# Patient Record
Sex: FEMALE | Race: BLACK OR AFRICAN AMERICAN | NOT HISPANIC OR LATINO | Employment: OTHER | URBAN - NONMETROPOLITAN AREA
[De-identification: names, ages, dates, MRNs, and addresses within clinical notes are randomized per-mention and may not be internally consistent; named-entity substitution may affect disease eponyms.]

---

## 2017-01-23 ENCOUNTER — TRANSCRIBE ORDERS (OUTPATIENT)
Dept: ADMINISTRATIVE | Facility: HOSPITAL | Age: 82
End: 2017-01-23

## 2017-01-23 ENCOUNTER — LAB (OUTPATIENT)
Dept: LAB | Facility: HOSPITAL | Age: 82
End: 2017-01-23
Attending: INTERNAL MEDICINE

## 2017-01-23 DIAGNOSIS — N18.30 CHRONIC KIDNEY DISEASE, STAGE III (MODERATE) (HCC): Primary | ICD-10-CM

## 2017-01-23 DIAGNOSIS — N18.9 CKD (CHRONIC KIDNEY DISEASE), UNSPECIFIED STAGE: ICD-10-CM

## 2017-01-23 LAB
ALBUMIN SERPL-MCNC: 4.2 G/DL (ref 3.5–5)
ANION GAP SERPL CALCULATED.3IONS-SCNC: 7 MMOL/L (ref 4–13)
BUN BLD-MCNC: 24 MG/DL (ref 5–21)
BUN/CREAT SERPL: 19.2 (ref 7–25)
CALCIUM SPEC-SCNC: 9.4 MG/DL (ref 8.4–10.4)
CHLORIDE SERPL-SCNC: 103 MMOL/L (ref 98–110)
CO2 SERPL-SCNC: 31 MMOL/L (ref 24–31)
CREAT BLD-MCNC: 1.25 MG/DL (ref 0.5–1.4)
CREAT UR-MCNC: 261.6 MG/DL
DEPRECATED RDW RBC AUTO: 56.1 FL (ref 40–54)
ERYTHROCYTE [DISTWIDTH] IN BLOOD BY AUTOMATED COUNT: 14.9 % (ref 12–15)
GFR SERPL CREATININE-BSD FRML MDRD: 50 ML/MIN/1.73
GLUCOSE BLD-MCNC: 106 MG/DL (ref 70–100)
HCT VFR BLD AUTO: 38 % (ref 37–47)
HGB BLD-MCNC: 11.9 G/DL (ref 12–16)
MCH RBC QN AUTO: 32.2 PG (ref 28–32)
MCHC RBC AUTO-ENTMCNC: 31.3 G/DL (ref 33–36)
MCV RBC AUTO: 102.7 FL (ref 82–98)
PHOSPHATE SERPL-MCNC: 3.9 MG/DL (ref 2.5–4.5)
PLATELET # BLD AUTO: 274 10*3/MM3 (ref 130–400)
PMV BLD AUTO: 11.1 FL (ref 6–12)
POTASSIUM BLD-SCNC: 4.4 MMOL/L (ref 3.5–5.3)
PROT UR-MCNC: NORMAL MG/DL (ref 0–13.5)
PTH-INTACT SERPL-MCNC: 32.2 PG/ML (ref 7.5–53.5)
RBC # BLD AUTO: 3.7 10*6/MM3 (ref 4.2–5.4)
SODIUM BLD-SCNC: 141 MMOL/L (ref 135–145)
URATE SERPL-MCNC: 5.4 MG/DL (ref 2.7–7.5)
WBC NRBC COR # BLD: 5.77 10*3/MM3 (ref 4.8–10.8)

## 2017-01-23 PROCEDURE — 36415 COLL VENOUS BLD VENIPUNCTURE: CPT | Performed by: INTERNAL MEDICINE

## 2017-01-23 PROCEDURE — 84156 ASSAY OF PROTEIN URINE: CPT | Performed by: INTERNAL MEDICINE

## 2017-01-23 PROCEDURE — 80069 RENAL FUNCTION PANEL: CPT | Performed by: INTERNAL MEDICINE

## 2017-01-23 PROCEDURE — 84550 ASSAY OF BLOOD/URIC ACID: CPT | Performed by: INTERNAL MEDICINE

## 2017-01-23 PROCEDURE — 85027 COMPLETE CBC AUTOMATED: CPT | Performed by: INTERNAL MEDICINE

## 2017-01-23 PROCEDURE — 83970 ASSAY OF PARATHORMONE: CPT | Performed by: INTERNAL MEDICINE

## 2017-01-23 PROCEDURE — 82570 ASSAY OF URINE CREATININE: CPT | Performed by: INTERNAL MEDICINE

## 2017-01-25 RX ORDER — FENOFIBRATE 145 MG/1
145 TABLET, COATED ORAL DAILY
COMMUNITY
End: 2017-01-31 | Stop reason: ALTCHOICE

## 2017-01-25 RX ORDER — HYDROCHLOROTHIAZIDE 12.5 MG/1
12.5 TABLET ORAL DAILY
COMMUNITY
End: 2017-01-31 | Stop reason: ALTCHOICE

## 2017-01-25 RX ORDER — GLIMEPIRIDE 2 MG/1
2 TABLET ORAL
COMMUNITY
End: 2017-01-31 | Stop reason: ALTCHOICE

## 2017-01-31 ENCOUNTER — OFFICE VISIT (OUTPATIENT)
Dept: INTERNAL MEDICINE | Facility: CLINIC | Age: 82
End: 2017-01-31

## 2017-01-31 VITALS
OXYGEN SATURATION: 97 % | WEIGHT: 90.8 LBS | DIASTOLIC BLOOD PRESSURE: 70 MMHG | SYSTOLIC BLOOD PRESSURE: 150 MMHG | HEART RATE: 84 BPM | BODY MASS INDEX: 16.09 KG/M2 | HEIGHT: 63 IN | RESPIRATION RATE: 16 BRPM

## 2017-01-31 DIAGNOSIS — D50.8 OTHER IRON DEFICIENCY ANEMIA: ICD-10-CM

## 2017-01-31 DIAGNOSIS — N18.30 CHRONIC KIDNEY DISEASE, STAGE III (MODERATE) (HCC): ICD-10-CM

## 2017-01-31 DIAGNOSIS — E03.9 ACQUIRED HYPOTHYROIDISM: ICD-10-CM

## 2017-01-31 DIAGNOSIS — I10 ESSENTIAL HYPERTENSION: Primary | ICD-10-CM

## 2017-01-31 PROCEDURE — 99214 OFFICE O/P EST MOD 30 MIN: CPT | Performed by: INTERNAL MEDICINE

## 2017-01-31 RX ORDER — FERROUS SULFATE 325(65) MG
325 TABLET ORAL
Qty: 270 TABLET | Refills: 1 | Status: SHIPPED | OUTPATIENT
Start: 2017-01-31 | End: 2017-07-27 | Stop reason: SDUPTHER

## 2017-01-31 RX ORDER — LEVOTHYROXINE SODIUM 0.1 MG/1
100 TABLET ORAL DAILY
Qty: 90 TABLET | Refills: 1 | Status: SHIPPED | OUTPATIENT
Start: 2017-01-31 | End: 2017-07-27

## 2017-01-31 RX ORDER — LISINOPRIL 10 MG/1
10 TABLET ORAL DAILY
Qty: 90 TABLET | Refills: 1 | Status: SHIPPED | OUTPATIENT
Start: 2017-01-31 | End: 2017-07-27

## 2017-01-31 NOTE — MR AVS SNAPSHOT
Saúl Santiago   1/31/2017 2:30 PM   Office Visit    Dept Phone:  [phone]   Encounter #:  [id number]    Provider:  Jorgito Muñoz DO   Department:  [facility]                Your Full Care Plan              Today's Medication Changes          These changes are accurate as of: 1/31/17  3:04 PM.  If you have any questions, ask your nurse or doctor.               Medication(s)that have changed:     ferrous sulfate 325 (65 FE) MG tablet   Take 1 tablet by mouth 3 (Three) Times a Day With Meals.   What changed:  See the new instructions.   Changed by:  Jorgito Muñoz DO         Stop taking medication(s)listed here:     fenofibrate 145 MG tablet   Commonly known as:  TRICOR   Stopped by:  Jorgito Muñoz DO           glimepiride 2 MG tablet   Commonly known as:  AMARYL   Stopped by:  Jorgito Muñoz DO           hydrochlorothiazide 12.5 MG tablet   Commonly known as:  HYDRODIURIL   Stopped by:  Jorgito Muñoz DO           metFORMIN 500 MG tablet   Commonly known as:  GLUCOPHAGE   Stopped by:  Jorgito Muñoz DO                Where to Get Your Medications      These medications were sent to [facility] [address] [place] [address] - [address] AT [facility] [address] - [phone] [facility] [phone]   [address], [facility] [address]    Hours:  24-hours Phone:  [phone]     ferrous sulfate 325 (65 FE) MG tablet    levothyroxine 100 MCG tablet    lisinopril 10 MG tablet                  Your Updated Medication List          This list is accurate as of: 1/31/17  3:04 PM.  Always use your most recent med list.                ferrous sulfate 325 (65 FE) MG tablet   Take 1 tablet by mouth 3 (Three) Times a Day With Meals.       levothyroxine 100 MCG tablet   Commonly known as:  SYNTHROID   Take 1 tablet by mouth Daily.       lisinopril 10 MG tablet   Commonly known as:  PRINIVIL,ZESTRIL   Take 1  "tablet by mouth Daily.               You Were Diagnosed With        Codes Comments    Essential hypertension    -  Primary ICD-10-CM: I10  ICD-9-CM: 401.9     Acquired hypothyroidism     ICD-10-CM: E03.9  ICD-9-CM: 244.9     Other iron deficiency anemia     ICD-10-CM: D50.8     Chronic kidney disease, stage III (moderate)     ICD-10-CM: N18.3  ICD-9-CM: 585.3       Instructions     None    Patient Instructions History      Upcoming Appointments     Visit Type Date Time Department    FOLLOW UP 2017  2:30 PM [facility]    FOLLOW UP 2017 10:00 AM [facility]      [employer] Signup     [facility] [employer] allows you to send messages to your doctor, view your test results, renew your prescriptions, schedule appointments, and more. To sign up, go to [employer] and click on the Sign Up Now link in the New User? box. Enter your [employer] Activation Code exactly as it appears below along with the last four digits of your Social Security Number and your Date of Birth () to complete the sign-up process. If you do not sign up before the expiration date, you must request a new code.    [employer] Activation Code: [email or web address]  Expires: 2017  3:04 PM    If you have questions, you can email [email or web address] or call [phone] to talk to our [employer] staff. Remember, [employer] is NOT to be used for urgent needs. For medical emergencies, dial 911.               Other Info from Your Visit           Your Appointments     2017 10:00 AM CDT   Follow Up with Jorgito Muñoz DO   [facility] MEDICAL [facility] (--)    [address] [place] [address] [address] [place] [id number] [phone]           Arrive 15 minutes prior to appointment.              Allergies     No Known Allergies      Vital Signs     Blood Pressure Pulse Respirations Height Weight Oxygen Saturation    150/70 (BP Location: Left arm, Patient Position: Sitting, Cuff Size: Adult) 84 16 63\" (160 cm) 90 lb " 12.8 oz (41.2 kg) 97%    Body Mass Index Smoking Status                16.08 kg/m2 Current Every Day Smoker          Problems and Diagnoses Noted     Acquired underactive thyroid    Chronic kidney disease, stage III (moderate)    High blood pressure    Mixed hyperlipidemia    Iron deficiency anemia

## 2017-01-31 NOTE — PROGRESS NOTES
"CC: Follow-up hypertension and CK D    History:  Saúl Santiago is a 83 y.o. female who presents today for follow-up for evaluation of the above:  She reports she has been doing reasonably well.  Her son presents with her.  She states she has not had any falls and has been ambulate and without difficulty.  She continues to take lisinopril for her blood pressure and CK D and is without symptoms or side effects.  She also continues on Synthroid and reports no issues with this.  She reports she does not eat much, though her weight has seemed reasonably stable recently.    ROS:  Review of Systems   Constitutional: Negative for chills and fever.   Respiratory: Negative for cough and shortness of breath.    Cardiovascular: Negative for chest pain and palpitations.       Ms. Santiago  reports that she has been smoking Cigarettes.  She has been smoking about 1.00 pack per day. She does not have any smokeless tobacco history on file. She reports that she does not drink alcohol or use illicit drugs.      Current Outpatient Prescriptions:   •  ferrous sulfate 325 (65 FE) MG tablet, Take 1 tablet by mouth 3 (Three) Times a Day With Meals., Disp: 270 tablet, Rfl: 1  •  levothyroxine (SYNTHROID) 100 MCG tablet, Take 1 tablet by mouth Daily., Disp: 90 tablet, Rfl: 1  •  lisinopril (PRINIVIL,ZESTRIL) 10 MG tablet, Take 1 tablet by mouth Daily., Disp: 90 tablet, Rfl: 1      OBJECTIVE:  Visit Vitals   • /70 (BP Location: Left arm, Patient Position: Sitting, Cuff Size: Adult)   • Pulse 84   • Resp 16   • Ht 63\" (160 cm)   • Wt 90 lb 12.8 oz (41.2 kg)   • SpO2 97%   • BMI 16.08 kg/m2      Physical Exam   Constitutional: She appears well-developed and well-nourished. No distress.   Cardiovascular: Normal rate, regular rhythm and normal heart sounds.    No murmur heard.  Pulmonary/Chest: Effort normal and breath sounds normal. No respiratory distress. She has no wheezes.       Assessment/Plan    Diagnoses and all orders for this " visit:    Essential hypertension   -     Lipid Panel; Future  -     lisinopril (PRINIVIL,ZESTRIL) 10 MG tablet; Take 1 tablet by mouth Daily.  -     Comprehensive Metabolic Panel; Future  Fair control, BP goal for age is <140/90 per JNC 8 guidelines and continue current medications. Given age and cognitive deficit, I am trying to limit the number of pharmacologic agents to limit drug interactions and risk of misuse.  We will continue to monitor clinically.    Acquired hypothyroidism  -     TSH; Future  -     levothyroxine (SYNTHROID) 100 MCG tablet; Take 1 tablet by mouth Daily.  We will continue Synthroid and plan to recheck TSH prior to next visit.    Other iron deficiency anemia  -     ferrous sulfate 325 (65 FE) MG tablet; Take 1 tablet by mouth 3 (Three) Times a Day With Meals.  Iron supplement was refilled.    Chronic kidney disease, stage III (moderate)  -     lisinopril (PRINIVIL,ZESTRIL) 10 MG tablet; Take 1 tablet by mouth Daily.  -     Comprehensive Metabolic Panel; Future  Follows with Dr. Koenig.  Creatinine on most recent renal function panel was 1.25 in January 2017.  This represents a better value then any in the past several years.  Continue ACE inhibitor at this time.      An After Visit Summary was printed and given to the patient at discharge.  Return in about 6 months (around 7/31/2017) for Recheck. Sooner if problems arise.         Jorgito Muñoz D.O. 1/31/2017

## 2017-05-09 DIAGNOSIS — I10 ESSENTIAL HYPERTENSION: ICD-10-CM

## 2017-05-09 DIAGNOSIS — E03.9 ACQUIRED HYPOTHYROIDISM: ICD-10-CM

## 2017-05-09 RX ORDER — LEVOTHYROXINE SODIUM 0.1 MG/1
100 TABLET ORAL DAILY
Qty: 90 TABLET | Refills: 0 | Status: SHIPPED | OUTPATIENT
Start: 2017-05-09 | End: 2017-07-27 | Stop reason: SDUPTHER

## 2017-05-09 RX ORDER — LISINOPRIL 10 MG/1
10 TABLET ORAL DAILY
Qty: 90 TABLET | Refills: 0 | Status: SHIPPED | OUTPATIENT
Start: 2017-05-09 | End: 2017-07-27 | Stop reason: SDUPTHER

## 2017-07-12 ENCOUNTER — TRANSCRIBE ORDERS (OUTPATIENT)
Dept: ADMINISTRATIVE | Facility: HOSPITAL | Age: 82
End: 2017-07-12

## 2017-07-12 ENCOUNTER — LAB (OUTPATIENT)
Dept: LAB | Facility: HOSPITAL | Age: 82
End: 2017-07-12
Attending: INTERNAL MEDICINE

## 2017-07-12 DIAGNOSIS — N18.30 CHRONIC KIDNEY DISEASE, STAGE III (MODERATE) (HCC): ICD-10-CM

## 2017-07-12 DIAGNOSIS — E03.9 ACQUIRED HYPOTHYROIDISM: ICD-10-CM

## 2017-07-12 DIAGNOSIS — I10 ESSENTIAL HYPERTENSION: ICD-10-CM

## 2017-07-12 DIAGNOSIS — N18.30 CHRONIC KIDNEY DISEASE, STAGE III (MODERATE) (HCC): Primary | ICD-10-CM

## 2017-07-12 LAB
ALBUMIN SERPL-MCNC: 4.1 G/DL (ref 3.5–5)
ALBUMIN/GLOB SERPL: 0.8 G/DL (ref 1.1–2.5)
ALP SERPL-CCNC: 49 U/L (ref 24–120)
ALT SERPL W P-5'-P-CCNC: 23 U/L (ref 0–54)
ANION GAP SERPL CALCULATED.3IONS-SCNC: 7 MMOL/L (ref 4–13)
ARTICHOKE IGE QN: 88 MG/DL (ref 0–99)
AST SERPL-CCNC: 22 U/L (ref 7–45)
BACTERIA UR QL AUTO: ABNORMAL /HPF
BILIRUB SERPL-MCNC: 0.4 MG/DL (ref 0.1–1)
BILIRUB UR QL STRIP: NEGATIVE
BUN BLD-MCNC: 14 MG/DL (ref 5–21)
BUN/CREAT SERPL: 12.2 (ref 7–25)
CALCIUM SPEC-SCNC: 9.5 MG/DL (ref 8.4–10.4)
CHLORIDE SERPL-SCNC: 106 MMOL/L (ref 98–110)
CHOLEST SERPL-MCNC: 223 MG/DL (ref 130–200)
CLARITY UR: ABNORMAL
CO2 SERPL-SCNC: 28 MMOL/L (ref 24–31)
COLOR UR: YELLOW
CREAT BLD-MCNC: 1.15 MG/DL (ref 0.5–1.4)
DEPRECATED RDW RBC AUTO: 53.4 FL (ref 40–54)
ERYTHROCYTE [DISTWIDTH] IN BLOOD BY AUTOMATED COUNT: 14.4 % (ref 12–15)
GFR SERPL CREATININE-BSD FRML MDRD: 55 ML/MIN/1.73
GLOBULIN UR ELPH-MCNC: 4.9 GM/DL
GLUCOSE BLD-MCNC: 93 MG/DL (ref 70–100)
GLUCOSE UR STRIP-MCNC: NEGATIVE MG/DL
HCT VFR BLD AUTO: 37.7 % (ref 37–47)
HDLC SERPL-MCNC: 75 MG/DL
HGB BLD-MCNC: 11.9 G/DL (ref 12–16)
HGB UR QL STRIP.AUTO: ABNORMAL
HYALINE CASTS UR QL AUTO: ABNORMAL /LPF
KETONES UR QL STRIP: NEGATIVE
LDLC/HDLC SERPL: 1.68 {RATIO}
LEUKOCYTE ESTERASE UR QL STRIP.AUTO: NEGATIVE
MCH RBC QN AUTO: 32.1 PG (ref 28–32)
MCHC RBC AUTO-ENTMCNC: 31.6 G/DL (ref 33–36)
MCV RBC AUTO: 101.6 FL (ref 82–98)
NITRITE UR QL STRIP: NEGATIVE
PH UR STRIP.AUTO: <=5 [PH] (ref 5–8)
PHOSPHATE SERPL-MCNC: 4.2 MG/DL (ref 2.5–4.5)
PLATELET # BLD AUTO: 218 10*3/MM3 (ref 130–400)
PMV BLD AUTO: 10.9 FL (ref 6–12)
POTASSIUM BLD-SCNC: 4.5 MMOL/L (ref 3.5–5.3)
PROT SERPL-MCNC: 9 G/DL (ref 6.3–8.7)
PROT UR QL STRIP: ABNORMAL
PTH-INTACT SERPL-MCNC: 27.6 PG/ML (ref 7.5–53.5)
RBC # BLD AUTO: 3.71 10*6/MM3 (ref 4.2–5.4)
RBC # UR: ABNORMAL /HPF
REF LAB TEST METHOD: ABNORMAL
SODIUM BLD-SCNC: 141 MMOL/L (ref 135–145)
SP GR UR STRIP: 1.02 (ref 1–1.03)
SQUAMOUS #/AREA URNS HPF: ABNORMAL /HPF
T4 FREE SERPL-MCNC: 0.6 NG/DL (ref 0.78–2.19)
TRIGL SERPL-MCNC: 109 MG/DL (ref 0–149)
TSH SERPL DL<=0.05 MIU/L-ACNC: 46.1 MIU/ML (ref 0.47–4.68)
URATE SERPL-MCNC: 5.1 MG/DL (ref 2.7–7.5)
UROBILINOGEN UR QL STRIP: ABNORMAL
WBC NRBC COR # BLD: 5.07 10*3/MM3 (ref 4.8–10.8)
WBC UR QL AUTO: ABNORMAL /HPF

## 2017-07-12 PROCEDURE — 36415 COLL VENOUS BLD VENIPUNCTURE: CPT | Performed by: INTERNAL MEDICINE

## 2017-07-12 PROCEDURE — 85027 COMPLETE CBC AUTOMATED: CPT | Performed by: INTERNAL MEDICINE

## 2017-07-12 PROCEDURE — 84100 ASSAY OF PHOSPHORUS: CPT | Performed by: INTERNAL MEDICINE

## 2017-07-12 PROCEDURE — 84550 ASSAY OF BLOOD/URIC ACID: CPT | Performed by: INTERNAL MEDICINE

## 2017-07-12 PROCEDURE — 80061 LIPID PANEL: CPT | Performed by: INTERNAL MEDICINE

## 2017-07-12 PROCEDURE — 80053 COMPREHEN METABOLIC PANEL: CPT | Performed by: INTERNAL MEDICINE

## 2017-07-12 PROCEDURE — 84439 ASSAY OF FREE THYROXINE: CPT | Performed by: INTERNAL MEDICINE

## 2017-07-12 PROCEDURE — 84443 ASSAY THYROID STIM HORMONE: CPT | Performed by: INTERNAL MEDICINE

## 2017-07-12 PROCEDURE — 81001 URINALYSIS AUTO W/SCOPE: CPT | Performed by: INTERNAL MEDICINE

## 2017-07-12 PROCEDURE — 83970 ASSAY OF PARATHORMONE: CPT | Performed by: INTERNAL MEDICINE

## 2017-07-27 ENCOUNTER — OFFICE VISIT (OUTPATIENT)
Dept: INTERNAL MEDICINE | Facility: CLINIC | Age: 82
End: 2017-07-27

## 2017-07-27 VITALS
RESPIRATION RATE: 16 BRPM | HEART RATE: 78 BPM | WEIGHT: 86.2 LBS | SYSTOLIC BLOOD PRESSURE: 146 MMHG | HEIGHT: 63 IN | BODY MASS INDEX: 15.27 KG/M2 | OXYGEN SATURATION: 98 % | DIASTOLIC BLOOD PRESSURE: 88 MMHG

## 2017-07-27 DIAGNOSIS — D50.8 OTHER IRON DEFICIENCY ANEMIA: ICD-10-CM

## 2017-07-27 DIAGNOSIS — E03.9 ACQUIRED HYPOTHYROIDISM: ICD-10-CM

## 2017-07-27 DIAGNOSIS — N18.30 CHRONIC KIDNEY DISEASE, STAGE III (MODERATE) (HCC): ICD-10-CM

## 2017-07-27 DIAGNOSIS — I10 ESSENTIAL HYPERTENSION: Primary | ICD-10-CM

## 2017-07-27 DIAGNOSIS — R63.4 LOSING WEIGHT: ICD-10-CM

## 2017-07-27 DIAGNOSIS — Z72.0 TOBACCO ABUSE: ICD-10-CM

## 2017-07-27 DIAGNOSIS — F01.50 VASCULAR DEMENTIA WITHOUT BEHAVIORAL DISTURBANCE (HCC): ICD-10-CM

## 2017-07-27 PROBLEM — F03.90 DEMENTIA WITHOUT BEHAVIORAL DISTURBANCE (HCC): Status: ACTIVE | Noted: 2017-07-27

## 2017-07-27 PROCEDURE — 99214 OFFICE O/P EST MOD 30 MIN: CPT | Performed by: INTERNAL MEDICINE

## 2017-07-27 RX ORDER — FERROUS SULFATE 325(65) MG
325 TABLET ORAL
Qty: 90 TABLET | Refills: 1 | Status: SHIPPED | OUTPATIENT
Start: 2017-07-27 | End: 2018-03-06 | Stop reason: SDUPTHER

## 2017-07-27 RX ORDER — LISINOPRIL 10 MG/1
10 TABLET ORAL DAILY
Qty: 90 TABLET | Refills: 1 | Status: SHIPPED | OUTPATIENT
Start: 2017-07-27 | End: 2018-03-15

## 2017-07-27 RX ORDER — LEVOTHYROXINE SODIUM 0.1 MG/1
100 TABLET ORAL DAILY
Qty: 90 TABLET | Refills: 1 | Status: ON HOLD | OUTPATIENT
Start: 2017-07-27 | End: 2018-03-20

## 2017-07-27 NOTE — PROGRESS NOTES
CC: Follow-up hypothyroidism    History:  Saúl Santiago is a 84 y.o. female who presents today for follow-up for evaluation of the above:  She reports she has been doing well.  Her son accompanies her, though she continues to live at home alone.  She reports she is able to ambulate around the house and has not had any falls.  She notes she has been taking her medications as directed, though her son, who helps her in a limited fashion admits that she has been missing doses.  He estimates she misses 2-3 doses on average per week.  She does continue to lose weight and she admits she does not eat very much because she is not hungry.  She states she also does not like preparing food for just herself.  There is no one else that per [employer] food for her, though her son does occasionally bring in take out.  She notes she has no headache or vision disturbance, though her son admits she has likely missed her what pressure medicine as well.  She does continue to smoke and has no plans to quit.    ROS:  Review of Systems   Constitutional: Negative for chills and fever.   HENT: Negative for congestion and sore throat.    Eyes: Negative for visual disturbance.   Respiratory: Negative for cough and shortness of breath.    Cardiovascular: Negative for chest pain and palpitations.   Neurological: Negative for headaches.       Ms. Santiago  reports that she has been smoking Cigarettes.  She has been smoking about 1.00 pack per day. She has never used smokeless tobacco. She reports that she does not drink alcohol or use illicit drugs.      Current Outpatient Prescriptions:   •  ferrous sulfate 325 (65 FE) MG tablet, Take 1 tablet by mouth 3 (Three) Times a Day With Meals., Disp: 270 tablet, Rfl: 1  •  lisinopril (PRINIVIL,ZESTRIL) 10 MG tablet, Take 1 tablet by mouth Daily., Disp: 90 tablet, Rfl: 0  •  levothyroxine (SYNTHROID, LEVOTHROID) 100 MCG tablet, Take 1 tablet by mouth Daily., Disp: 90 tablet, Rfl: 0      OBJECTIVE:  BP  "146/88 (BP Location: Left arm, Patient Position: Sitting, Cuff Size: Adult)  Pulse 78  Resp 16  Ht 63\" (160 cm)  Wt 86 lb 3.2 oz (39.1 kg)  SpO2 98%  BMI 15.27 kg/m2   Physical ExamNone    Assessment/Plan    Diagnoses and all orders for this visit:    Essential hypertension  -     lisinopril (PRINIVIL,ZESTRIL) 10 MG tablet; Take 1 tablet by mouth Daily.  Well controlled, BP goal for age is <140/90 per JNC 8 guidelines and continue current medications    Tobacco abuse  Patient was counseled on and understood the many dangers of continuing to use tobacco. Despite this, Ms. Santiago states quitting is not an immediate priority at this time. I reminded the patient that if quitting becomes an increased priority to contact us for help with quitting including pharmacologic & nonpharmacologic options or any additional resources.     Chronic kidney disease, stage III (moderate)  Continue lisinopril.  She does follow with Dr. Koenig.  Creatinine stable on recent labs.    Acquired hypothyroidism  -     levothyroxine (SYNTHROID, LEVOTHROID) 100 MCG tablet; Take 1 tablet by mouth Daily.  TSH elevated and T4 low consistent with missed doses.  She is encouraged to make sure she takes all scheduled doses.  I spoke with her son regarding her dementia and his need to help her with medications.  He expressed understanding.    Other iron deficiency anemia  -     ferrous sulfate 325 (65 FE) MG tablet; Take 1 tablet by mouth 3 (Three) Times a Day With Meals.  She may continue ferrous sulfate.  Hemoglobin 11.9 on recent check.    Vascular dementia without behavioral disturbance  She seems to be independent with all activities of daily living, though I have concern with her worsening cognitive function that she may not be safe to live at home indefinitely.  I again spoke with her son regarding this condition.  I recommended he assist further with medications and with helping to provide meals.  We also spoke about alternative living " situation such as nursing home.  He states his sister-in-law helps manage a nursing home and he will talk with her further about this.  I feel her weight loss is likely a function of her vascular dementia.    Losing weight  We discussed the possibility of providing meals for providing low sodium frozen meals that would make preparation less difficult for her.  He expressed understanding, but intimated he feels she gets food when she is hungry.      An After Visit Summary was printed and given to the patient at discharge.  Return in about 6 months (around 1/27/2018). Sooner if problems arise.         Jorgito Muñoz D.O. 7/27/2017

## 2018-01-26 ENCOUNTER — TRANSCRIBE ORDERS (OUTPATIENT)
Dept: ADMINISTRATIVE | Facility: HOSPITAL | Age: 83
End: 2018-01-26

## 2018-01-26 ENCOUNTER — APPOINTMENT (OUTPATIENT)
Dept: LAB | Facility: HOSPITAL | Age: 83
End: 2018-01-26
Attending: INTERNAL MEDICINE

## 2018-01-26 DIAGNOSIS — N18.30 CHRONIC KIDNEY DISEASE, STAGE III (MODERATE) (HCC): Primary | ICD-10-CM

## 2018-01-26 LAB
ALBUMIN SERPL-MCNC: 4.5 G/DL (ref 3.5–5)
ANION GAP SERPL CALCULATED.3IONS-SCNC: 9 MMOL/L (ref 4–13)
BACTERIA UR QL AUTO: ABNORMAL /HPF
BILIRUB UR QL STRIP: ABNORMAL
BUN BLD-MCNC: 19 MG/DL (ref 5–21)
BUN/CREAT SERPL: 11.4 (ref 7–25)
CALCIUM SPEC-SCNC: 9.4 MG/DL (ref 8.4–10.4)
CHLORIDE SERPL-SCNC: 104 MMOL/L (ref 98–110)
CLARITY UR: CLEAR
CO2 SERPL-SCNC: 28 MMOL/L (ref 24–31)
COLOR UR: YELLOW
CREAT BLD-MCNC: 1.67 MG/DL (ref 0.5–1.4)
DEPRECATED RDW RBC AUTO: 57.3 FL (ref 40–54)
ERYTHROCYTE [DISTWIDTH] IN BLOOD BY AUTOMATED COUNT: 14.8 % (ref 12–15)
GFR SERPL CREATININE-BSD FRML MDRD: 35 ML/MIN/1.73
GLUCOSE BLD-MCNC: 94 MG/DL (ref 70–100)
GLUCOSE UR STRIP-MCNC: NEGATIVE MG/DL
HCT VFR BLD AUTO: 33.5 % (ref 37–47)
HGB BLD-MCNC: 10.8 G/DL (ref 12–16)
HGB UR QL STRIP.AUTO: ABNORMAL
HYALINE CASTS UR QL AUTO: ABNORMAL /LPF
KETONES UR QL STRIP: ABNORMAL
LEUKOCYTE ESTERASE UR QL STRIP.AUTO: ABNORMAL
MCH RBC QN AUTO: 33.5 PG (ref 28–32)
MCHC RBC AUTO-ENTMCNC: 32.2 G/DL (ref 33–36)
MCV RBC AUTO: 104 FL (ref 82–98)
NITRITE UR QL STRIP: NEGATIVE
PH UR STRIP.AUTO: 5.5 [PH] (ref 5–8)
PHOSPHATE SERPL-MCNC: 3.8 MG/DL (ref 2.5–4.5)
PLATELET # BLD AUTO: 232 10*3/MM3 (ref 130–400)
PMV BLD AUTO: 10.2 FL (ref 6–12)
POTASSIUM BLD-SCNC: 4.5 MMOL/L (ref 3.5–5.3)
PROT UR QL STRIP: ABNORMAL
PTH-INTACT SERPL-MCNC: 72.9 PG/ML (ref 7.5–53.5)
RBC # BLD AUTO: 3.22 10*6/MM3 (ref 4.2–5.4)
RBC # UR: ABNORMAL /HPF
REF LAB TEST METHOD: ABNORMAL
SODIUM BLD-SCNC: 141 MMOL/L (ref 135–145)
SP GR UR STRIP: >=1.03 (ref 1–1.03)
SQUAMOUS #/AREA URNS HPF: ABNORMAL /HPF
URATE SERPL-MCNC: 6.2 MG/DL (ref 2.7–7.5)
UROBILINOGEN UR QL STRIP: ABNORMAL
WBC NRBC COR # BLD: 7.66 10*3/MM3 (ref 4.8–10.8)
WBC UR QL AUTO: ABNORMAL /HPF

## 2018-01-26 PROCEDURE — 36415 COLL VENOUS BLD VENIPUNCTURE: CPT

## 2018-01-26 PROCEDURE — 85027 COMPLETE CBC AUTOMATED: CPT | Performed by: INTERNAL MEDICINE

## 2018-01-26 PROCEDURE — 84550 ASSAY OF BLOOD/URIC ACID: CPT | Performed by: INTERNAL MEDICINE

## 2018-01-26 PROCEDURE — 83970 ASSAY OF PARATHORMONE: CPT | Performed by: INTERNAL MEDICINE

## 2018-01-26 PROCEDURE — 80069 RENAL FUNCTION PANEL: CPT | Performed by: INTERNAL MEDICINE

## 2018-01-26 PROCEDURE — 81001 URINALYSIS AUTO W/SCOPE: CPT | Performed by: INTERNAL MEDICINE

## 2018-03-06 DIAGNOSIS — D50.8 OTHER IRON DEFICIENCY ANEMIA: ICD-10-CM

## 2018-03-06 RX ORDER — FERROUS SULFATE 325(65) MG
325 TABLET ORAL
Qty: 270 TABLET | Refills: 0 | Status: SHIPPED | OUTPATIENT
Start: 2018-03-06

## 2018-03-15 ENCOUNTER — APPOINTMENT (OUTPATIENT)
Dept: CT IMAGING | Facility: HOSPITAL | Age: 83
End: 2018-03-15

## 2018-03-15 ENCOUNTER — HOSPITAL ENCOUNTER (INPATIENT)
Facility: HOSPITAL | Age: 83
LOS: 5 days | Discharge: NURSING, REHAB OR LONG-TERM CARE | End: 2018-03-20
Attending: EMERGENCY MEDICINE | Admitting: INTERNAL MEDICINE

## 2018-03-15 ENCOUNTER — APPOINTMENT (OUTPATIENT)
Dept: GENERAL RADIOLOGY | Facility: HOSPITAL | Age: 83
End: 2018-03-15

## 2018-03-15 DIAGNOSIS — R13.12 OROPHARYNGEAL DYSPHAGIA: ICD-10-CM

## 2018-03-15 DIAGNOSIS — R79.89 ELEVATED TSH: Primary | ICD-10-CM

## 2018-03-15 DIAGNOSIS — T68.XXXA HYPOTHERMIA, INITIAL ENCOUNTER: ICD-10-CM

## 2018-03-15 DIAGNOSIS — M62.82 NON-TRAUMATIC RHABDOMYOLYSIS: ICD-10-CM

## 2018-03-15 DIAGNOSIS — S06.5XAA BILATERAL SUBDURAL HEMATOMAS (HCC): ICD-10-CM

## 2018-03-15 DIAGNOSIS — E03.9 ACQUIRED HYPOTHYROIDISM: ICD-10-CM

## 2018-03-15 DIAGNOSIS — E87.5 HYPERKALEMIA: ICD-10-CM

## 2018-03-15 DIAGNOSIS — R91.8 LUNG MASS: ICD-10-CM

## 2018-03-15 DIAGNOSIS — N17.9 AKI (ACUTE KIDNEY INJURY) (HCC): ICD-10-CM

## 2018-03-15 DIAGNOSIS — R41.82 ALTERED MENTAL STATUS, UNSPECIFIED ALTERED MENTAL STATUS TYPE: ICD-10-CM

## 2018-03-15 LAB
ALBUMIN SERPL-MCNC: 4.2 G/DL (ref 3.5–5)
ALBUMIN/GLOB SERPL: 0.7 G/DL (ref 1.1–2.5)
ALP SERPL-CCNC: 96 U/L (ref 24–120)
ALT SERPL W P-5'-P-CCNC: 34 U/L (ref 0–54)
ANION GAP SERPL CALCULATED.3IONS-SCNC: 18 MMOL/L (ref 4–13)
ANION GAP SERPL CALCULATED.3IONS-SCNC: 9 MMOL/L (ref 4–13)
ARTERIAL PATENCY WRIST A: POSITIVE
AST SERPL-CCNC: 74 U/L (ref 7–45)
ATMOSPHERIC PRESS: 750 MMHG
BACTERIA UR QL AUTO: ABNORMAL /HPF
BASE EXCESS BLDA CALC-SCNC: -3.5 MMOL/L (ref 0–2)
BASOPHILS # BLD AUTO: 0.02 10*3/MM3 (ref 0–0.2)
BASOPHILS NFR BLD AUTO: 0.3 % (ref 0–2)
BDY SITE: ABNORMAL
BILIRUB SERPL-MCNC: 1 MG/DL (ref 0.1–1)
BILIRUB UR QL STRIP: NEGATIVE
BODY TEMPERATURE: 37 C
BUN BLD-MCNC: 36 MG/DL (ref 5–21)
BUN BLD-MCNC: 36 MG/DL (ref 5–21)
BUN/CREAT SERPL: 17.4 (ref 7–25)
BUN/CREAT SERPL: 18.7 (ref 7–25)
CALCIUM SPEC-SCNC: 8.7 MG/DL (ref 8.4–10.4)
CALCIUM SPEC-SCNC: 9.7 MG/DL (ref 8.4–10.4)
CHLORIDE SERPL-SCNC: 108 MMOL/L (ref 98–110)
CHLORIDE SERPL-SCNC: 112 MMOL/L (ref 98–110)
CK SERPL-CCNC: 1077 U/L (ref 0–203)
CLARITY UR: CLEAR
CO2 SERPL-SCNC: 19 MMOL/L (ref 24–31)
CO2 SERPL-SCNC: 25 MMOL/L (ref 24–31)
COLOR UR: YELLOW
CREAT BLD-MCNC: 1.93 MG/DL (ref 0.5–1.4)
CREAT BLD-MCNC: 2.07 MG/DL (ref 0.5–1.4)
D-LACTATE SERPL-SCNC: 2.4 MMOL/L (ref 0.5–2)
D-LACTATE SERPL-SCNC: 2.9 MMOL/L (ref 0.5–2)
DEPRECATED RDW RBC AUTO: 53.3 FL (ref 40–54)
EOSINOPHIL # BLD AUTO: 0 10*3/MM3 (ref 0–0.7)
EOSINOPHIL NFR BLD AUTO: 0 % (ref 0–4)
ERYTHROCYTE [DISTWIDTH] IN BLOOD BY AUTOMATED COUNT: 14 % (ref 12–15)
GFR SERPL CREATININE-BSD FRML MDRD: 28 ML/MIN/1.73
GFR SERPL CREATININE-BSD FRML MDRD: 30 ML/MIN/1.73
GLOBULIN UR ELPH-MCNC: 6.2 GM/DL
GLUCOSE BLD-MCNC: 109 MG/DL (ref 70–100)
GLUCOSE BLD-MCNC: 56 MG/DL (ref 70–100)
GLUCOSE BLDC GLUCOMTR-MCNC: 71 MG/DL (ref 70–130)
GLUCOSE UR STRIP-MCNC: NEGATIVE MG/DL
HCO3 BLDA-SCNC: 20.9 MMOL/L (ref 20–26)
HCT VFR BLD AUTO: 36.8 % (ref 37–47)
HGB BLD-MCNC: 12 G/DL (ref 12–16)
HGB UR QL STRIP.AUTO: ABNORMAL
HOLD SPECIMEN: NORMAL
HYALINE CASTS UR QL AUTO: ABNORMAL /LPF
IMM GRANULOCYTES # BLD: 0.13 10*3/MM3 (ref 0–0.03)
IMM GRANULOCYTES NFR BLD: 1.7 % (ref 0–5)
INR PPP: 0.89 (ref 0.91–1.09)
KETONES UR QL STRIP: ABNORMAL
LEUKOCYTE ESTERASE UR QL STRIP.AUTO: NEGATIVE
LIPASE SERPL-CCNC: 148 U/L (ref 23–203)
LYMPHOCYTES # BLD AUTO: 0.97 10*3/MM3 (ref 0.72–4.86)
LYMPHOCYTES NFR BLD AUTO: 13 % (ref 15–45)
Lab: ABNORMAL
MCH RBC QN AUTO: 33.8 PG (ref 28–32)
MCHC RBC AUTO-ENTMCNC: 32.6 G/DL (ref 33–36)
MCV RBC AUTO: 103.7 FL (ref 82–98)
MODALITY: ABNORMAL
MONOCYTES # BLD AUTO: 0.34 10*3/MM3 (ref 0.19–1.3)
MONOCYTES NFR BLD AUTO: 4.6 % (ref 4–12)
NEUTROPHILS # BLD AUTO: 6 10*3/MM3 (ref 1.87–8.4)
NEUTROPHILS NFR BLD AUTO: 80.4 % (ref 39–78)
NITRITE UR QL STRIP: NEGATIVE
NRBC BLD MANUAL-RTO: 0.3 /100 WBC (ref 0–0)
PCO2 BLDA: 34.3 MM HG (ref 35–45)
PH BLDA: 7.39 PH UNITS (ref 7.35–7.45)
PH UR STRIP.AUTO: <=5 [PH] (ref 5–8)
PLATELET # BLD AUTO: 261 10*3/MM3 (ref 130–400)
PMV BLD AUTO: 10 FL (ref 6–12)
PO2 BLDA: 57.4 MM HG (ref 83–108)
POTASSIUM BLD-SCNC: 4.4 MMOL/L (ref 3.5–5.3)
POTASSIUM BLD-SCNC: 5.6 MMOL/L (ref 3.5–5.3)
PROT SERPL-MCNC: 10.4 G/DL (ref 6.3–8.7)
PROT UR QL STRIP: ABNORMAL
PROTHROMBIN TIME: 12.3 SECONDS (ref 11.9–14.6)
RBC # BLD AUTO: 3.55 10*6/MM3 (ref 4.2–5.4)
RBC # UR: ABNORMAL /HPF
REF LAB TEST METHOD: ABNORMAL
SAO2 % BLDCOA: 88 % (ref 94–99)
SODIUM BLD-SCNC: 145 MMOL/L (ref 135–145)
SODIUM BLD-SCNC: 146 MMOL/L (ref 135–145)
SP GR UR STRIP: 1.02 (ref 1–1.03)
SQUAMOUS #/AREA URNS HPF: ABNORMAL /HPF
T3FREE SERPL-MCNC: 0.78 PG/ML (ref 2.77–5.27)
T4 FREE SERPL-MCNC: <0.07 NG/DL (ref 0.78–2.19)
TROPONIN I SERPL-MCNC: 0.02 NG/ML (ref 0–0.03)
TSH SERPL DL<=0.05 MIU/L-ACNC: 52.9 MIU/ML (ref 0.47–4.68)
UROBILINOGEN UR QL STRIP: ABNORMAL
VENTILATOR MODE: ABNORMAL
WBC NRBC COR # BLD: 7.46 10*3/MM3 (ref 4.8–10.8)
WBC UR QL AUTO: ABNORMAL /HPF

## 2018-03-15 PROCEDURE — 85610 PROTHROMBIN TIME: CPT | Performed by: EMERGENCY MEDICINE

## 2018-03-15 PROCEDURE — 63710000001 INSULIN REGULAR HUMAN PER 5 UNITS: Performed by: EMERGENCY MEDICINE

## 2018-03-15 PROCEDURE — 82550 ASSAY OF CK (CPK): CPT | Performed by: EMERGENCY MEDICINE

## 2018-03-15 PROCEDURE — 84443 ASSAY THYROID STIM HORMONE: CPT | Performed by: EMERGENCY MEDICINE

## 2018-03-15 PROCEDURE — 87040 BLOOD CULTURE FOR BACTERIA: CPT | Performed by: EMERGENCY MEDICINE

## 2018-03-15 PROCEDURE — 84484 ASSAY OF TROPONIN QUANT: CPT | Performed by: EMERGENCY MEDICINE

## 2018-03-15 PROCEDURE — 80053 COMPREHEN METABOLIC PANEL: CPT | Performed by: EMERGENCY MEDICINE

## 2018-03-15 PROCEDURE — 87150 DNA/RNA AMPLIFIED PROBE: CPT | Performed by: EMERGENCY MEDICINE

## 2018-03-15 PROCEDURE — 93010 ELECTROCARDIOGRAM REPORT: CPT | Performed by: INTERNAL MEDICINE

## 2018-03-15 PROCEDURE — 84481 FREE ASSAY (FT-3): CPT | Performed by: FAMILY MEDICINE

## 2018-03-15 PROCEDURE — 83605 ASSAY OF LACTIC ACID: CPT | Performed by: EMERGENCY MEDICINE

## 2018-03-15 PROCEDURE — 25010000002 DEXAMETHASONE PER 1 MG: Performed by: EMERGENCY MEDICINE

## 2018-03-15 PROCEDURE — 84439 ASSAY OF FREE THYROXINE: CPT | Performed by: FAMILY MEDICINE

## 2018-03-15 PROCEDURE — 25010000002 METHYLPREDNISOLONE PER 125 MG: Performed by: FAMILY MEDICINE

## 2018-03-15 PROCEDURE — 71045 X-RAY EXAM CHEST 1 VIEW: CPT

## 2018-03-15 PROCEDURE — 81001 URINALYSIS AUTO W/SCOPE: CPT | Performed by: EMERGENCY MEDICINE

## 2018-03-15 PROCEDURE — G8996 SWALLOW CURRENT STATUS: HCPCS

## 2018-03-15 PROCEDURE — 83690 ASSAY OF LIPASE: CPT | Performed by: EMERGENCY MEDICINE

## 2018-03-15 PROCEDURE — 93005 ELECTROCARDIOGRAM TRACING: CPT | Performed by: EMERGENCY MEDICINE

## 2018-03-15 PROCEDURE — 72070 X-RAY EXAM THORAC SPINE 2VWS: CPT

## 2018-03-15 PROCEDURE — 05H633Z INSERTION OF INFUSION DEVICE INTO LEFT SUBCLAVIAN VEIN, PERCUTANEOUS APPROACH: ICD-10-PCS | Performed by: FAMILY MEDICINE

## 2018-03-15 PROCEDURE — 25810000003 DEXTROSE-NACL PER 500 ML: Performed by: FAMILY MEDICINE

## 2018-03-15 PROCEDURE — 82803 BLOOD GASES ANY COMBINATION: CPT

## 2018-03-15 PROCEDURE — 87147 CULTURE TYPE IMMUNOLOGIC: CPT | Performed by: EMERGENCY MEDICINE

## 2018-03-15 PROCEDURE — G8997 SWALLOW GOAL STATUS: HCPCS

## 2018-03-15 PROCEDURE — 25010000002 HYDRALAZINE PER 20 MG: Performed by: FAMILY MEDICINE

## 2018-03-15 PROCEDURE — 92610 EVALUATE SWALLOWING FUNCTION: CPT

## 2018-03-15 PROCEDURE — 99223 1ST HOSP IP/OBS HIGH 75: CPT | Performed by: NEUROLOGICAL SURGERY

## 2018-03-15 PROCEDURE — 82962 GLUCOSE BLOOD TEST: CPT

## 2018-03-15 PROCEDURE — 36600 WITHDRAWAL OF ARTERIAL BLOOD: CPT

## 2018-03-15 PROCEDURE — 85025 COMPLETE CBC W/AUTO DIFF WBC: CPT | Performed by: EMERGENCY MEDICINE

## 2018-03-15 PROCEDURE — 70450 CT HEAD/BRAIN W/O DYE: CPT

## 2018-03-15 PROCEDURE — 36415 COLL VENOUS BLD VENIPUNCTURE: CPT | Performed by: EMERGENCY MEDICINE

## 2018-03-15 PROCEDURE — C1751 CATH, INF, PER/CENT/MIDLINE: HCPCS

## 2018-03-15 PROCEDURE — 99285 EMERGENCY DEPT VISIT HI MDM: CPT

## 2018-03-15 PROCEDURE — 72100 X-RAY EXAM L-S SPINE 2/3 VWS: CPT

## 2018-03-15 RX ORDER — LISINOPRIL 10 MG/1
10 TABLET ORAL 2 TIMES DAILY
COMMUNITY

## 2018-03-15 RX ORDER — METHYLPREDNISOLONE SODIUM SUCCINATE 125 MG/2ML
125 INJECTION, POWDER, LYOPHILIZED, FOR SOLUTION INTRAMUSCULAR; INTRAVENOUS EVERY 8 HOURS
Status: DISCONTINUED | OUTPATIENT
Start: 2018-03-15 | End: 2018-03-16

## 2018-03-15 RX ORDER — LISINOPRIL 10 MG/1
10 TABLET ORAL DAILY
Status: DISCONTINUED | OUTPATIENT
Start: 2018-03-15 | End: 2018-03-20 | Stop reason: HOSPADM

## 2018-03-15 RX ORDER — DEXAMETHASONE SODIUM PHOSPHATE 4 MG/ML
4 INJECTION, SOLUTION INTRA-ARTICULAR; INTRALESIONAL; INTRAMUSCULAR; INTRAVENOUS; SOFT TISSUE ONCE
Status: COMPLETED | OUTPATIENT
Start: 2018-03-15 | End: 2018-03-15

## 2018-03-15 RX ORDER — HYDRALAZINE HYDROCHLORIDE 20 MG/ML
20 INJECTION INTRAMUSCULAR; INTRAVENOUS ONCE
Status: COMPLETED | OUTPATIENT
Start: 2018-03-16 | End: 2018-03-15

## 2018-03-15 RX ORDER — DEXTROSE MONOHYDRATE 25 G/50ML
25 INJECTION, SOLUTION INTRAVENOUS ONCE
Status: COMPLETED | OUTPATIENT
Start: 2018-03-15 | End: 2018-03-15

## 2018-03-15 RX ORDER — SODIUM CHLORIDE 0.9 % (FLUSH) 0.9 %
10 SYRINGE (ML) INJECTION AS NEEDED
Status: DISCONTINUED | OUTPATIENT
Start: 2018-03-15 | End: 2018-03-20 | Stop reason: HOSPADM

## 2018-03-15 RX ORDER — SODIUM CHLORIDE 9 MG/ML
100 INJECTION, SOLUTION INTRAVENOUS CONTINUOUS
Status: DISCONTINUED | OUTPATIENT
Start: 2018-03-15 | End: 2018-03-16

## 2018-03-15 RX ORDER — SODIUM POLYSTYRENE SULFONATE 15 G/60ML
15 SUSPENSION ORAL; RECTAL ONCE
Status: COMPLETED | OUTPATIENT
Start: 2018-03-15 | End: 2018-03-15

## 2018-03-15 RX ORDER — FERROUS SULFATE 325(65) MG
325 TABLET ORAL
Status: DISCONTINUED | OUTPATIENT
Start: 2018-03-15 | End: 2018-03-20 | Stop reason: HOSPADM

## 2018-03-15 RX ORDER — SODIUM CHLORIDE 0.9 % (FLUSH) 0.9 %
1-10 SYRINGE (ML) INJECTION AS NEEDED
Status: DISCONTINUED | OUTPATIENT
Start: 2018-03-15 | End: 2018-03-20 | Stop reason: HOSPADM

## 2018-03-15 RX ORDER — ONDANSETRON 2 MG/ML
4 INJECTION INTRAMUSCULAR; INTRAVENOUS EVERY 6 HOURS PRN
Status: DISCONTINUED | OUTPATIENT
Start: 2018-03-15 | End: 2018-03-20 | Stop reason: HOSPADM

## 2018-03-15 RX ORDER — LEVOTHYROXINE SODIUM 0.1 MG/1
100 TABLET ORAL
Status: DISCONTINUED | OUTPATIENT
Start: 2018-03-15 | End: 2018-03-16

## 2018-03-15 RX ORDER — DEXTROSE AND SODIUM CHLORIDE 5; .9 G/100ML; G/100ML
75 INJECTION, SOLUTION INTRAVENOUS CONTINUOUS
Status: DISCONTINUED | OUTPATIENT
Start: 2018-03-15 | End: 2018-03-16

## 2018-03-15 RX ORDER — HYDRALAZINE HYDROCHLORIDE 20 MG/ML
10 INJECTION INTRAMUSCULAR; INTRAVENOUS ONCE
Status: COMPLETED | OUTPATIENT
Start: 2018-03-15 | End: 2018-03-15

## 2018-03-15 RX ORDER — ALUMINA, MAGNESIA, AND SIMETHICONE 2400; 2400; 240 MG/30ML; MG/30ML; MG/30ML
15 SUSPENSION ORAL EVERY 6 HOURS PRN
Status: DISCONTINUED | OUTPATIENT
Start: 2018-03-15 | End: 2018-03-20 | Stop reason: HOSPADM

## 2018-03-15 RX ORDER — LEVOTHYROXINE SODIUM ANHYDROUS 100 UG/5ML
200 INJECTION, POWDER, LYOPHILIZED, FOR SOLUTION INTRAVENOUS ONCE
Status: COMPLETED | OUTPATIENT
Start: 2018-03-15 | End: 2018-03-15

## 2018-03-15 RX ADMIN — HYDRALAZINE HYDROCHLORIDE 10 MG: 20 INJECTION INTRAMUSCULAR; INTRAVENOUS at 21:44

## 2018-03-15 RX ADMIN — INSULIN HUMAN 10 UNITS: 100 INJECTION, SOLUTION PARENTERAL at 13:49

## 2018-03-15 RX ADMIN — METHYLPREDNISOLONE SODIUM SUCCINATE 125 MG: 125 INJECTION, POWDER, FOR SOLUTION INTRAMUSCULAR; INTRAVENOUS at 19:09

## 2018-03-15 RX ADMIN — SODIUM POLYSTYRENE SULFONATE 15 G: 15 SUSPENSION ORAL; RECTAL at 13:50

## 2018-03-15 RX ADMIN — LEVOTHYROXINE SODIUM ANHYDROUS 200 MCG: 100 INJECTION, POWDER, LYOPHILIZED, FOR SOLUTION INTRAVENOUS at 13:44

## 2018-03-15 RX ADMIN — DEXAMETHASONE SODIUM PHOSPHATE 4 MG: 4 INJECTION, SOLUTION INTRA-ARTICULAR; INTRALESIONAL; INTRAMUSCULAR; INTRAVENOUS; SOFT TISSUE at 13:40

## 2018-03-15 RX ADMIN — SODIUM CHLORIDE 1000 ML: 9 INJECTION, SOLUTION INTRAVENOUS at 12:10

## 2018-03-15 RX ADMIN — HYDRALAZINE HYDROCHLORIDE 20 MG: 20 INJECTION INTRAMUSCULAR; INTRAVENOUS at 23:37

## 2018-03-15 RX ADMIN — DEXTROSE AND SODIUM CHLORIDE 75 ML/HR: 5; 900 INJECTION, SOLUTION INTRAVENOUS at 19:05

## 2018-03-15 RX ADMIN — DEXTROSE MONOHYDRATE 25 G: 25 INJECTION, SOLUTION INTRAVENOUS at 13:41

## 2018-03-15 RX ADMIN — SODIUM CHLORIDE 100 ML/HR: 9 INJECTION, SOLUTION INTRAVENOUS at 17:27

## 2018-03-15 NOTE — H&P
Patient Care Team:  Jorgito Muñoz DO as PCP - General (Family Medicine)  Jorgito Muñoz DO as PCP - Claims Attributed  Jose Koenig MD as Consulting Physician (Nephrology)    Chief complaint altered mental status    Subjective     HPI: HPI    This patient is an 84-year-old cachectic malnourished female who is brought in by her family with altered mental status.  There currently is no family present at bedside.  By emergency department report, her son was present earlier today and states that she has had sudden change in her mental status.  The patient is unable to provide any details regarding her history of present illness.    Review of Systems     A 12 point review of systems is unable to be obtained secondary to patient's altered mental status    Past Medical History:   Diagnosis Date   • Abnormal SPEP    • Arthritis    • Chronic constipation    • COPD (chronic obstructive pulmonary disease)    • CRD (chronic renal disease), stage III     stage III   • Diabetes mellitus    • Disease of thyroid gland     HYPOTHYROIDISM   • Hx of macrocytic anemia    • Hyperlipidemia    • Hypertension    • Hypoglycemia    • Losing weight    • Macrocytic anemia    • Nicotine abuse    • Vitamin D deficiency      Past Surgical History:   Procedure Laterality Date   • BRAIN SURGERY      Head injury as a child     Family History   Problem Relation Age of Onset   • No Known Problems Mother    • Diabetes Father      Social History   Substance Use Topics   • Smoking status: Current Every Day Smoker     Packs/day: 1.00     Types: Cigarettes   • Smokeless tobacco: Never Used   • Alcohol use No       (Not in a hospital admission)  Allergies:  Review of patient's allergies indicates no known allergies.      Objective    Neurologic Exam  Vital Signs  Temp:  [91.5 °F (33.1 °C)-94.6 °F (34.8 °C)] 94.6 °F (34.8 °C)  Heart Rate:  [57-83] 78  Resp:  [18-20] 20  BP: (119-206)/(44-91) 121/91    Physical Exam    Appears  cachectic and malnourished  No acute distress  Awakens to voice  States name  HEENT atraumatic, normocephalic  Neck supple  Abdomen soft, nontender  Extremities no clubbing, edema, cyanosis  Neurologic exam  Cranial nerves II through XII grossly intact  Patient moves all extremities against gravity  Plus or minus follows commands  No focal neurologic deficit  Sensation appears intact light touch in upper and lower externus  Detailed neurologic exam limited by patient's altered mental status  No cerebellar findings  No long tract signs    Results Review:   I reviewed the patient's new clinical results.  I reviewed the patient's new imaging results and agree with the interpretation.  I reviewed the patient's other test results and agree with the interpretation      Lab Results (last 24 hours)     Procedure Component Value Units Date/Time    CK [[id number]]  (Abnormal) Collected:  03/15/18 1202    Specimen:  Blood Updated:  03/15/18 1347     Creatine Kinase 1,077 (H) U/L     TSH [[id number]]  (Abnormal) Collected:  03/15/18 1202    Specimen:  Blood Updated:  03/15/18 1307     TSH 52.900 (H) mIU/mL     Comprehensive Metabolic Panel [[id number]]  (Abnormal) Collected:  03/15/18 1202    Specimen:  Blood Updated:  03/15/18 1249     Glucose 109 (H) mg/dL      BUN 36 (H) mg/dL      Comment: Specimen hemolyzed. Results may be affected.        Creatinine 1.93 (H) mg/dL      Sodium 145 mmol/L      Potassium 5.6 (H) mmol/L      Comment: Specimen hemolyzed.  Results may be affected.        Chloride 108 mmol/L      CO2 19.0 (L) mmol/L      Calcium 9.7 mg/dL      Total Protein 10.4 (H) g/dL      Albumin 4.20 g/dL      ALT (SGPT) 34 U/L      Comment: Specimen hemolyzed.  Results may be affected.        AST (SGOT) 74 (H) U/L      Comment: Specimen hemolyzed.  Results may be affected.        Alkaline Phosphatase 96 U/L      Comment: Specimen hemolyzed. Results may be affected.        Total Bilirubin 1.0 mg/dL      eGFR   Amer 30  (L) mL/min/1.73      Globulin 6.2 gm/dL      A/G Ratio 0.7 (L) g/dL      BUN/Creatinine Ratio 18.7     Anion Gap 18.0 (H) mmol/L     Narrative:       The MDRD GFR formula is only valid for adults with stable renal function between ages 18 and 70.    Lactic Acid, Plasma [[id number]]  (Abnormal) Collected:  03/15/18 1202    Specimen:  Blood Updated:  03/15/18 1249     Lactate 2.9 (C) mmol/L     Lactic Acid, Reflex Timer (This will reflex a repeat order 3-3:15 hours after ordered.) [[id number]] Collected:  03/15/18 1202    Specimen:  Blood Updated:  03/15/18 1249    Troponin [[id number]]  (Normal) Collected:  03/15/18 1202    Specimen:  Blood Updated:  03/15/18 1248     Troponin I 0.016 ng/mL     Lipase [[id number]]  (Normal) Collected:  03/15/18 1202    Specimen:  Blood Updated:  03/15/18 1236     Lipase 148 U/L     Urinalysis With / Culture If Indicated - Urine, Catheter [[id number]]  (Abnormal) Collected:  03/15/18 1218    Specimen:  Urine from Urine, Catheter Updated:  03/15/18 1230     Color, UA Yellow     Appearance, UA Clear     pH, UA <=5.0     Specific Gravity, UA 1.017     Glucose, UA Negative     Ketones, UA Trace (A)     Bilirubin, UA Negative     Blood, UA Moderate (2+) (A)     Protein,  mg/dL (2+) (A)     Leuk Esterase, UA Negative     Nitrite, UA Negative     Urobilinogen, UA 0.2 E.U./dL    Urinalysis, Microscopic Only - Urine, Clean Catch [[id number]]  (Abnormal) Collected:  03/15/18 1218    Specimen:  Urine from Urine, Catheter Updated:  03/15/18 1230     RBC, UA 0-2 (A) /HPF      WBC, UA 0-2 (A) /HPF      Bacteria, UA None Seen /HPF      Squamous Epithelial Cells, UA None Seen /HPF      Hyaline Casts, UA 0-2 /LPF      Methodology Automated Microscopy    Protime-INR [[id number]]  (Abnormal) Collected:  03/15/18 1202    Specimen:  Blood Updated:  03/15/18 1228     Protime 12.3 Seconds      INR 0.89 (L)    CBC & Differential [[id number]] Collected:  03/15/18 1202    Specimen:  Blood Updated:  03/15/18 1214     Narrative:       The following orders were created for panel order CBC & Differential.  Procedure                               Abnormality         Status                     ---------                               -----------         ------                     CBC Auto Differential[[id number]]        Abnormal            Final result                 Please view results for these tests on the individual orders.    CBC Auto Differential [[id number]]  (Abnormal) Collected:  03/15/18 1202    Specimen:  Blood Updated:  03/15/18 1219     WBC 7.46 10*3/mm3      RBC 3.55 (L) 10*6/mm3      Hemoglobin 12.0 g/dL      Hematocrit 36.8 (L) %      .7 (H) fL      MCH 33.8 (H) pg      MCHC 32.6 (L) g/dL      RDW 14.0 %      RDW-SD 53.3 fl      MPV 10.0 fL      Platelets 261 10*3/mm3      Neutrophil % 80.4 (H) %      Lymphocyte % 13.0 (L) %      Monocyte % 4.6 %      Eosinophil % 0.0 %      Basophil % 0.3 %      Immature Grans % 1.7 %      Neutrophils, Absolute 6.00 10*3/mm3      Lymphocytes, Absolute 0.97 10*3/mm3      Monocytes, Absolute 0.34 10*3/mm3      Eosinophils, Absolute 0.00 10*3/mm3      Basophils, Absolute 0.02 10*3/mm3      Immature Grans, Absolute 0.13 (H) 10*3/mm3      nRBC 0.3 (H) /100 WBC     Blood Culture - Blood, [[id number]] Collected:  03/15/18 1202    Specimen:  Blood from Arm, Left Updated:  03/15/18 1219    Blood Culture - Blood, [[id number]] Collected:  03/15/18 1202    Specimen:  Blood from Arm, Right Updated:  03/15/18 1218    Blood Gas, Arterial [[id number]]  (Abnormal) Collected:  03/15/18 1155    Specimen:  Arterial Blood Updated:  03/15/18 1200     Site Right Radial     Garcia's Test Positive     pH, Arterial 7.392 pH units      pCO2, Arterial 34.3 (L) mm Hg      pO2, Arterial 57.4 (L) mm Hg      HCO3, Arterial 20.9 mmol/L      Base Excess, Arterial -3.5 (L) mmol/L      O2 Saturation, Arterial 88.0 (L) %      Temperature 37.0 C      Barometric Pressure for Blood Gas 750 mmHg      Modality Room Air      Ventilator Mode NA     Collected by [id number]        Ct Head Without Contrast    Result Date: 3/15/2018  Narrative: EXAMINATION: CT HEAD WO CONTRAST-  3/15/2018 1:06 PM CDT  CT SCAN OF THE HEAD, WITHOUT CONTRAST:  HISTORY: Confusion/delirium, altered level of consciousness, unexplained  COMPARISONS: 1/11/2015 head CT  TECHNIQUE:  Radiation dose equals DLP 1115 mGy-cm.  Automated exposure control dose reduction technique was implemented.   CT evaluation of the head without intravenous contrast. 5 mm transaxial images were obtained.   2-D sagittal and coronal reconstruction images were generated.  FINDINGS: Image quality degradation related to streak-like motion artifact appreciated.  There is diffuse central and cortical atrophy.  There is low-attenuation in the periventricular and subcortical white matter compatible with chronic ischemic changes.  There are small bilateral subdural collections, slightly greater attenuation than CSF. Suspect chronic subdural hemorrhagic changes. The subdural collection along the right convexity is slightly greater measuring nearly 5 mm in thickness. The left subdural collection measures approximately 3.5 mm in thickness along the convexity.  There is no intraaxial hemorrhage. There is no acute extra-axial hemorrhage.  There is no mass effect or midline shift. There is no hydrocephalus.  Bone window imaging reveals no calvarial abnormality.  Paranasal sinuses imaged in part are clear.       Impression: 1. Small bilateral subdural collections, attenuation slightly greater than CSF, suspect chronic subdural hemorrhagic change 2. No acute hemorrhage.. 3. Atrophy and chronic ischemic white matter change.     This report was finalized on 03/15/2018 13:14 by Dr. Trevor Amaya MD.    Xr Chest 1 View    Result Date: 3/15/2018  Narrative: EXAMINATION: XR CHEST 1 VW- 3/15/2018 1:43 PM CDT  HISTORY: AMS.  REPORT: Comparison is made with the study from 1/11/2015.  The lungs are hyperinflated, no  infiltrate is identified. There is a small nodular density at the left base which is probably a nipple shadow. There is a small masslike density in the medial right lung base that measures approximately 2 cm. Heart size is normal. No pneumothorax or pleural effusion is identified. The osseous structures and upper abdomen appear unremarkable.      Impression: 1. 2 cm masslike density in the medial right lung base, CT of chest without contrast is recommended for follow-up to exclude neoplasm. A small nodular density at the left lung base may represent a nipple shadow. This can be evaluated on CT as well. COPD.  REPORT was flagged for attention to the emergency department. This report was finalized on 03/15/2018 13:48 by Dr. James Solorzano MD.        Patient Active Problem List   Diagnosis   • Vitamin D deficiency   • Losing weight   • Hypoglycemia   • Essential hypertension   • Mixed hyperlipidemia   • Acquired hypothyroidism   • Diabetes mellitus   • Chronic kidney disease, stage III (moderate)   • COPD (chronic obstructive pulmonary disease)   • Chronic constipation   • Arthritis   • Abnormal SPEP   • Macrocytic anemia   • Tobacco abuse   • Vascular dementia without behavioral disturbance   • Elevated TSH       Assessment/Plan     84-year-old female with malnutrition and altered mental status.  Patient appears to be in poor physical health.  Her CT head shows bilateral chronic hygromas vs subdural hematoma without mass effect.  These can be observed with serial imaging.  No acute neurosurgical intervention at this time.  Patient is being admitted to the ICU for workup of his delirium.  No family currently present at bedside.  Will follow along.    I discussed the patients findings and my recommendations with patient    Elroy Stephens MD  03/15/18  3:05 PM

## 2018-03-15 NOTE — H&P
[facility] Medicine Services  INPATIENT PROGRESS NOTE    Length of Stay: 0  Date of Admission: 3/15/2018  Primary Care Physician: Jorgito Muñoz DO    Subjective   Chief Complaint: Unable to obtain any meaningful information from patient    HPI   Patient found down at home per nursing.  Son had been here and states he had talked to her on phone last night.  She was reported to be okay      Review of Systems     Unable to do ROS due to mental status.  .     Objective    Temp:  [91.5 °F (33.1 °C)-94.6 °F (34.8 °C)] 94.6 °F (34.8 °C)  Heart Rate:  [57-83] 78  Resp:  [18-20] 20  BP: ()/(39-91) 111/39  Physical Exam   Constitutional: She appears well-developed.   Cachetic     HENT:   Head: Normocephalic and atraumatic.   Right Ear: External ear normal.   Left Ear: External ear normal.   Mouth/Throat: Oropharynx is clear and moist.   Eyes: Pupils are equal, round, and reactive to light.   Neck: No JVD present. No thyromegaly present.   Cardiovascular: Normal rate, regular rhythm and normal heart sounds.  Exam reveals no gallop and no friction rub.    No murmur heard.  Pulmonary/Chest: Effort normal and breath sounds normal. She has no wheezes.   Abdominal: Soft. Bowel sounds are normal.   Flat    Musculoskeletal: She exhibits no edema.   Neurological: No cranial nerve deficit.   Awake.   Confused.    Skin: Skin is dry.   cool   Psychiatric:   Confused.          Results Review:  I have reviewed the labs, radiology results, and diagnostic studies.    Laboratory Data:     Results from last 7 days  Lab Units 03/15/18  1202   WBC 10*3/mm3 7.46   HEMOGLOBIN g/dL 12.0   HEMATOCRIT % 36.8*   PLATELETS 10*3/mm3 261          Results from last 7 days  Lab Units 03/15/18  1202   SODIUM mmol/L 145   POTASSIUM mmol/L 5.6*   CHLORIDE mmol/L 108   CO2 mmol/L 19.0*   BUN mg/dL 36*   CREATININE mg/dL 1.93*   CALCIUM mg/dL 9.7   BILIRUBIN mg/dL 1.0   ALK PHOS U/L 96   ALT (SGPT) U/L 34   AST  (SGOT) U/L 74*   GLUCOSE mg/dL 109*       Culture Data:        Radiology Data:   Imaging Results (last 24 hours)     Procedure Component Value Units Date/Time    XR Chest 1 View [[id number]] Collected:  03/15/18 1343     Updated:  03/15/18 1351    Narrative:       EXAMINATION: XR CHEST 1 VW- 3/15/2018 1:43 PM CDT     HISTORY: AMS.     REPORT: Comparison is made with the study from 1/11/2015.     The lungs are hyperinflated, no infiltrate is identified. There is a  small nodular density at the left base which is probably a nipple  shadow. There is a small masslike density in the medial right lung base  that measures approximately 2 cm. Heart size is normal. No pneumothorax  or pleural effusion is identified. The osseous structures and upper  abdomen appear unremarkable.       Impression:       1. 2 cm masslike density in the medial right lung base, CT of chest  without contrast is recommended for follow-up to exclude neoplasm. A  small nodular density at the left lung base may represent a nipple  shadow. This can be evaluated on CT as well. COPD.     REPORT was flagged for attention to the emergency department.  This report was finalized on 03/15/2018 13:48 by Dr. James Solorzano MD.    CT Head Without Contrast [[id number]] Collected:  03/15/18 1306     Updated:  03/15/18 1317    Narrative:       EXAMINATION: CT HEAD WO CONTRAST-  3/15/2018 1:06 PM CDT     CT SCAN OF THE HEAD, WITHOUT CONTRAST:      HISTORY: Confusion/delirium, altered level of consciousness, unexplained     COMPARISONS: 1/11/2015 head CT      TECHNIQUE:     Radiation dose equals DLP 1115 mGy-cm.  Automated exposure control dose  reduction technique was implemented.        CT evaluation of the head without intravenous contrast. 5 mm transaxial  images were obtained.   2-D sagittal and coronal reconstruction images  were generated.     FINDINGS: Image quality degradation related to streak-like motion  artifact appreciated.     There is diffuse central  and cortical atrophy.     There is low-attenuation in the periventricular and subcortical white  matter compatible with chronic ischemic changes.     There are small bilateral subdural collections, slightly greater  attenuation than CSF. Suspect chronic subdural hemorrhagic changes. The  subdural collection along the right convexity is slightly greater  measuring nearly 5 mm in thickness. The left subdural collection  measures approximately 3.5 mm in thickness along the convexity.     There is no intraaxial hemorrhage. There is no acute extra-axial  hemorrhage.     There is no mass effect or midline shift. There is no hydrocephalus.     Bone window imaging reveals no calvarial abnormality.     Paranasal sinuses imaged in part are clear.          Impression:       1. Small bilateral subdural collections, attenuation slightly greater  than CSF, suspect chronic subdural hemorrhagic change  2. No acute hemorrhage..  3. Atrophy and chronic ischemic white matter change.              This report was finalized on 03/15/2018 13:14 by Dr. Trevor Amaya MD.          I have reviewed the patient current medications.     Assessment/Plan     Hospital Problem List     Elevated TSH          Assessment    Metabolic encephalopathy  Do not know if there is any underlying demential issues as there is no family here for interview.  Markedly elevated TSH in a patient with known hypothyroidism.  Unknown if she is actually taking her medication.  Hypertension.  Patient is on lisinopril, unknown if actually taking.   Abnormal CT head, bilateral subdural v hygromas, neurosurgery has seen in the ER  Fall at home, patient found in floor.  No evaluation of cervical spine done in ER  Will CT neck.   Abnormal chest xray will need outpatient follow up with non contrasted CT of chest.   Hypothermia    Plan    Admit to the ICU  CT neck  IVF  Rewarm patient  Resume home medications.   Follow up of abnl CT head by neurosurgery  AM LAB  CMP CBC   Check  t4t3  Neuro evaluations with vital signs.            Discharge Planning: Unable to plan at this time.      Cassie Matthews,    03/15/18   3:30 PM

## 2018-03-15 NOTE — PLAN OF CARE
Problem: Patient Care Overview  Goal: Plan of Care Review  Outcome: Ongoing (interventions implemented as appropriate)   03/15/18 1401   Coping/Psychosocial   Plan of Care Reviewed With patient;caregiver   OTHER   Outcome Summary CBSE completed. Pureed, honey, nectar, and thin consistencies were presented. Pt required consistent cues to maintain alertness/attention to participate. She had a harsh cough prior to PO trials that her son stated was typical for her as she has COPD and has a history of tobacco use. The pt had increased oral prep and transit time. She had no overt s/s of aspiration with pureed, honey, or nectar. She did have audible swallows, 3 multiples swallows, and belching with thin liquid via straw, followed by an immediate cough. She appeared to tolerate thin liquids better via spoon, but did still have belching after one trial. Recommend pureed diet and nectar thick liquids. Meds crushed in applesauce. Pt has one liquid meds ordered. Discussed trying it via spoon instead of via straw with pt's nurse. 1:1 assistance with meals.

## 2018-03-15 NOTE — THERAPY EVALUATION
Acute Care - Speech Language Pathology   Swallow Initial Evaluation [facility]     Patient Name: Saúl Santiago  : 1933  MRN: [id number]  Today's Date: 3/15/2018               Admit Date: 3/15/2018  CBSE completed. Pureed, honey, nectar, and thin consistencies were presented. Pt required consistent cues to maintain alertness/attention to participate. She had a harsh cough prior to PO trials that her son stated was typical for her as she has COPD and has a history of tobacco use. The pt had increased oral prep and transit time. She had no overt s/s of aspiration with pureed, honey, or nectar. She did have audible swallows, 3 multiples swallows, and belching with thin liquid via straw, followed by an immediate cough. She appeared to tolerate thin liquids better via spoon, but did still have belching after one trial. Recommend pureed diet and nectar thick liquids. Meds crushed in applesauce. Pt has one liquid meds ordered. Discussed trying it via spoon instead of via straw with pt's nurse. 1:1 assistance with meals.  Sridhar Levy, CCC-SLP 3/15/2018 2:05 PM    Visit Dx:     ICD-10-CM ICD-9-CM   1. Oropharyngeal dysphagia R13.12 787.22     Patient Active Problem List   Diagnosis   • Vitamin D deficiency   • Losing weight   • Hypoglycemia   • Essential hypertension   • Mixed hyperlipidemia   • Acquired hypothyroidism   • Diabetes mellitus   • Chronic kidney disease, stage III (moderate)   • COPD (chronic obstructive pulmonary disease)   • Chronic constipation   • Arthritis   • Abnormal SPEP   • Macrocytic anemia   • Tobacco abuse   • Vascular dementia without behavioral disturbance     Past Medical History:   Diagnosis Date   • Abnormal SPEP    • Arthritis    • Chronic constipation    • COPD (chronic obstructive pulmonary disease)    • CRD (chronic renal disease), stage III     stage III   • Diabetes mellitus    • Disease of thyroid gland     HYPOTHYROIDISM   • Hx of macrocytic anemia    • Hyperlipidemia     • Hypertension    • Hypoglycemia    • Losing weight    • Macrocytic anemia    • Nicotine abuse    • Vitamin D deficiency      Past Surgical History:   Procedure Laterality Date   • BRAIN SURGERY      Head injury as a child          SWALLOW EVALUATION (last 72 hours)      SLP Adult Swallow Evaluation     Row Name 03/15/18 1301                   Rehab Evaluation    Document Type evaluation  -MB           General Information    Patient Profile Reviewed yes  -MB        Pertinent History Of Current Problem AMS. Hx of DM, HTN, hyperlipidemia, COPD, chronic renal disease  -MB        Current Method of Nutrition NPO  -MB        Precautions/Limitations, Vision other (see comments)   unknown  -MB        Precautions/Limitations, Hearing other (see comments)   unknown  -MB        Prior Level of Function-Communication other (see comments)   unknown  -MB        Prior Level of Function-Swallowing other (see comments)   unknown  -MB        Plans/Goals Discussed with patient  -MB        Barriers to Rehab cognitive status  -MB        Patient's Goals for Discharge patient could not state  -MB           Pain Assessment    Additional Documentation Pain Scale: FACES Pre/Post-Treatment (Group)  -MB           Pain Scale: FACES Pre/Post-Treatment    Pain: FACES Scale, Pretreatment 0-->no hurt  -MB           Oral Motor and Function    Dentition Assessment edentulous, does not have dentures  -MB        Secretion Management WNL/WFL  -MB        Mucosal Quality dry  -MB        Volitional Swallow unable to elicit  -MB           Oral Musculature and Cranial Nerve Assessment    Oral Motor General Assessment generalized oral motor weakness  -MB           Clinical Swallow Eval    Oral Prep Phase impaired  -MB        Oral Transit impaired  -MB        Oral Residue WFL  -MB        Pharyngeal Phase suspected pharyngeal impairment  -MB        Esophageal Phase unremarkable  -MB        Clinical Swallow Evaluation Summary Pureed, honey, nectar, and thin  consistencies were presented. Pt required consistent cues to maintain alertness/attention to participate. She had a harsh cough prior to PO trials that her son stated was typical for her as she has COPD and has a history of tobacco use. The pt had increased oral prep and transit time. She had no overt s/s of aspiration with pureed, honey, or nectar. She did have audible swallows, 3 multiples swallows, and belching with thin liquid via straw, followed by an immediate cough. She appeared to tolerate thin liquids better via spoon, but did still have belching after one trial.   -MB           Oral Prep Concerns    Oral Prep Concerns increased prep time  -MB        Prolonged Mastication --  -MB        Increased Prep Time nectar;honey;pudding  -MB           Oral Transit Concerns    Oral Transit Concerns increased oral transit time  -MB        Increased Oral Transit Time nectar;honey;pudding  -MB           Pharyngeal Phase Concerns    Pharyngeal Phase Concerns multiple swallows;cough  -MB        Multiple Swallows thin  -MB        Cough thin  -MB           Clinical Impression    SLP Swallowing Diagnosis moderate;oral dysfunction;pharyngeal dysfunction  -MB        Functional Impact risk of aspiration/pneumonia;risk of malnutrition;risk of dehydration  -MB        Rehab Potential/Prognosis, Swallowing adequate, monitor progress closely  -MB        Criteria for Skilled Therapeutic Interventions Met demonstrates skilled criteria  -MB           Recommendations    Therapy Frequency (SLP) at least;3 days per week  -MB        Predicted Duration Therapy Intervention (Days) until discharge  -MB        SLP Diet Recommendation puree;nectar thick liquids  -MB        Recommended Precautions and Strategies upright posture during/after eating;alternate between small bites of food and sips of liquid  -MB        SLP Rec. for Method of Medication Administration meds crushed;with pudding or applesauce  -MB        Monitor for Signs of Aspiration  yes;cough;gurgly voice;throat clearing;pneumonia  -MB        Anticipated Dischage Disposition skilled nursing facility  -MB        Demonstrates Need for Referral to Another Service clinical nutrition services/dietitian  -MB           Swallow Goals (SLP)    Oral Nutrition/Hydration Goal Selection (SLP) oral nutrition/hydration, SLP goal 1  -MB        Lingual Strengthening Goal Selection (SLP) lingual strengthening, SLP goal 1  -MB        Pharyngeal Strengthening Exercise Goal Selection (SLP) pharyngeal strengthening exercise, SLP goal 1  -MB        Additional Documentation lingual strengthening goal selection (SLP);pharyngeal strengthening exercise goal selection (SLP)  -MB           Oral Nutrition/Hydration Goal 1 (SLP)    Oral Nutrition/Hydration Goal 1, SLP Pt will tolerate least restrictive diet with no overt s/s of aspiration.  -MB        Time Frame (Oral Nutrition/Hydration Goal 1, SLP) short term goal (STG);by discharge  -MB        Barriers (Oral Nutrition/Hydration Goal 1, SLP) n/a  -MB        Progress/Outcomes (Oral Nutrition/Hydration Goal 1, SLP) goal ongoing  -MB           Lingual Strengthening Goal 1 (SLP)    Activity (Lingual Strengthening Goal 1, SLP) increase lingual tone/sensation/control/coordination/movement;increase tongue back strength  -MB        Increase Lingual Tone/Sensation/Control/Coordination/Movement lingual resistance exercises  -MB        Increase Tongue Back Strength lingual movement exercises  -MB        [place]/Accuracy (Lingual Strengthening Goal 1, SLP) independently (over 90% accuracy)  -MB        Time Frame (Lingual Strengthening Goal 1, SLP) short term goal (STG);by discharge  -MB        Barriers (Lingual Strengthening Goal 1, SLP) n/a  -MB        Progress/Outcomes (Lingual Strengthening Goal 1, SLP) goal ongoing  -MB           Pharyngeal Strengthening Exercise Goal 1 (SLP)    Activity (Pharyngeal Strengthening Goal 1, SLP) increase timing;increase squeeze/positive pressure  generation;increase tongue base retraction  -MB        Increase Timing gustatory stimulation (sour/cold)  -MB        Increase Squeeze/Positive Pressure Generation hard effortful swallow  -MB        Increase Tongue Base Retraction keira  -MB        [place]/Accuracy (Pharyngeal Strengthening Goal 1, SLP) independently (over 90% accuracy)  -MB        Time Frame (Pharyngeal Strengthening Goal 1, SLP) short term goal (STG);by discharge  -MB        Barriers (Pharyngeal Strengthening Goal 1, SLP) n/a  -MB        Progress/Outcomes (Pharyngeal Strengthening Goal 1, SLP) goal ongoing  -MB           Dysphagia Treatment Objectives and Progress    Dysphagia Treatment Objectives --  -MB          User Key  (r) = Recorded By, (t) = Taken By, (c) = Cosigned By    Initials Name Effective Dates    SANTY Levy, [facility]-SLP 08/02/16 -         EDUCATION  The patient has been educated in the following areas:   Dysphagia (Swallowing Impairment).    SLP Recommendation and Plan  SLP Swallowing Diagnosis: moderate, oral dysfunction, pharyngeal dysfunction  SLP Diet Recommendation: puree, nectar thick liquids  Recommended Precautions and Strategies: upright posture during/after eating, alternate between small bites of food and sips of liquid     Monitor for Signs of Aspiration: yes, cough, gurgly voice, throat clearing, pneumonia     Criteria for Skilled Therapeutic Interventions Met: demonstrates skilled criteria  Anticipated Dischage Disposition: skilled nursing facility  Rehab Potential/Prognosis, Swallowing: adequate, monitor progress closely  Therapy Frequency (SLP): at least, 3 days per week  Predicted Duration Therapy Intervention (Days): until discharge  Demonstrates Need for Referral to Another Service: clinical nutrition services/dietitian    Plan of Care Reviewed With: patient, caregiver  Plan of Care Review  Plan of Care Reviewed With: patient, caregiver  Outcome Summary: CBSE completed. Pureed, honey, nectar, and thin  consistencies were presented. Pt required consistent cues to maintain alertness/attention to participate. She had a harsh cough prior to PO trials that her son stated was typical for her as she has COPD and has a history of tobacco use. The pt had increased oral prep and transit time. She had no overt s/s of aspiration with pureed, honey, or nectar. She did have audible swallows, 3 multiples swallows, and belching with thin liquid via straw, followed by an immediate cough. She appeared to tolerate thin liquids better via spoon, but did still have belching after one trial. Recommend pureed diet and nectar thick liquids. Meds crushed in applesauce. Pt has one liquid meds ordered. Discussed trying it via spoon instead of via straw with pt's nurse. 1:1 assistance with meals.          SLP GOALS     Row Name 03/15/18 130             Oral Nutrition/Hydration Goal 1 (SLP)    Oral Nutrition/Hydration Goal 1, SLP Pt will tolerate least restrictive diet with no overt s/s of aspiration.  -MB      Time Frame (Oral Nutrition/Hydration Goal 1, SLP) short term goal (STG);by discharge  -MB      Barriers (Oral Nutrition/Hydration Goal 1, SLP) n/a  -MB      Progress/Outcomes (Oral Nutrition/Hydration Goal 1, SLP) goal ongoing  -MB         Lingual Strengthening Goal 1 (SLP)    Activity (Lingual Strengthening Goal 1, SLP) increase lingual tone/sensation/control/coordination/movement;increase tongue back strength  -MB      Increase Lingual Tone/Sensation/Control/Coordination/Movement lingual resistance exercises  -MB      Increase Tongue Back Strength lingual movement exercises  -MB      [place]/Accuracy (Lingual Strengthening Goal 1, SLP) independently (over 90% accuracy)  -MB      Time Frame (Lingual Strengthening Goal 1, SLP) short term goal (STG);by discharge  -MB      Barriers (Lingual Strengthening Goal 1, SLP) n/a  -MB      Progress/Outcomes (Lingual Strengthening Goal 1, SLP) goal ongoing  -MB         Pharyngeal  Strengthening Exercise Goal 1 (SLP)    Activity (Pharyngeal Strengthening Goal 1, SLP) increase timing;increase squeeze/positive pressure generation;increase tongue base retraction  -MB      Increase Timing gustatory stimulation (sour/cold)  -MB      Increase Squeeze/Positive Pressure Generation hard effortful swallow  -MB      Increase Tongue Base Retraction keira  -MB      [place]/Accuracy (Pharyngeal Strengthening Goal 1, SLP) independently (over 90% accuracy)  -MB      Time Frame (Pharyngeal Strengthening Goal 1, SLP) short term goal (STG);by discharge  -MB      Barriers (Pharyngeal Strengthening Goal 1, SLP) n/a  -MB      Progress/Outcomes (Pharyngeal Strengthening Goal 1, SLP) goal ongoing  -MB        User Key  (r) = Recorded By, (t) = Taken By, (c) = Cosigned By    Initials Name Provider Type    SANTY Levy CCC-SLP Speech and Language Pathologist             SLP Outcome Measures (last 72 hours)      SLP Outcome Measures     Row Name 03/15/18 1400             SLP Outcome Measures    Outcome Measure Used? Adult NOMS  -MB         FCM Scores    FCM Chosen Swallowing  -MB      Swallowing FCM Score 4  -MB        User Key  (r) = Recorded By, (t) = Taken By, (c) = Cosigned By    Initials Name Effective Dates    AMY Pacheco 08/02/16 -            Time Calculation:         Time Calculation- SLP     Row Name 03/15/18 1404             Time Calculation- SLP    SLP Start Time 1301  -MB      SLP Stop Time 1404  -MB      SLP Time Calculation (min) 63 min  -MB      SLP Received On 03/15/18  -MB      SLP Goal Re-Cert Due Date 03/25/18  -MB        User Key  (r) = Recorded By, (t) = Taken By, (c) = Cosigned By    Initials Name Provider Type    SANTY Levy CCC-SLP Speech and Language Pathologist          Therapy Charges for Today     Code Description Service Date Service Provider Modifiers Qty    23272982263 HC ST SWALLOWING CURRENT STATUS 3/15/2018 AMY Douglas GN, CK 1     98136894093 HC ST SWALLOWING PROJECTED 3/15/2018 AMY Douglas GN, CK 1    [id number] HC ST EVAL ORAL PHARYNG SWALLOW 4 3/15/2018 AMY Douglas GN 1          SLP G-Codes  SLP NOMS Used?: Yes  Functional Limitations: Swallowing  Swallow Current Status (): At least 40 percent but less than 60 percent impaired, limited or restricted  Swallow Goal Status (): At least 40 percent but less than 60 percent impaired, limited or restricted    AMY Carrizales  3/15/2018

## 2018-03-15 NOTE — CONSULTS
10 cm Midline catheter placed in pt left cephalic vein. Pt tolerated procedure well. Pt arm so small that US guidance not necessary due to easy visualization of vein on arm surface. Line flushes and draws well. Sterile dressing applied.

## 2018-03-15 NOTE — ED PROVIDER NOTES
Subjective   Patient is an 84-year-old female who presents with altered mental status.  Patient's son came to her house this morning and noted her to be on the ground in the living room next to the couch.  She was altered and not making sense according to him.  Last normal yesterday when patient talked to her on the phone.  Patient was not oriented per EMS to place or time.  She was oriented to person.  No history of dementia or Alzheimer's.  Son stated he did not witness any syncopal event.  Patient was not incontinent.  There is no evidence of trauma.  Patient is on Synthroid and lisinopril and iron pills.  History of diabetes, hyperlipidemia, hypertension.  No other history known at this time.        History provided by:  Relative  History limited by:  Mental status change      Review of Systems   Unable to perform ROS: Mental status change       Past Medical History:   Diagnosis Date   • Abnormal SPEP    • Arthritis    • Chronic constipation    • COPD (chronic obstructive pulmonary disease)    • CRD (chronic renal disease), stage III     stage III   • Diabetes mellitus    • Disease of thyroid gland     HYPOTHYROIDISM   • Hx of macrocytic anemia    • Hyperlipidemia    • Hypertension    • Hypoglycemia    • Losing weight    • Macrocytic anemia    • Nicotine abuse    • Vitamin D deficiency        No Known Allergies    Past Surgical History:   Procedure Laterality Date   • BRAIN SURGERY      Head injury as a child       Family History   Problem Relation Age of Onset   • No Known Problems Mother    • Diabetes Father        Social History     Social History   • Marital status: Single     Social History Main Topics   • Smoking status: Current Every Day Smoker     Packs/day: 1.00     Types: Cigarettes   • Smokeless tobacco: Never Used   • Alcohol use No   • Drug use: No   • Sexual activity: Defer     Other Topics Concern   • Not on file       Lab Results (last 24 hours)     Procedure Component Value Units Date/Time     Blood Gas, Arterial [[id number]]  (Abnormal) Collected:  03/15/18 1155    Specimen:  Arterial Blood Updated:  03/15/18 1200     Site Right Radial     Garcia's Test Positive     pH, Arterial 7.392 pH units      pCO2, Arterial 34.3 (L) mm Hg      pO2, Arterial 57.4 (L) mm Hg      HCO3, Arterial 20.9 mmol/L      Base Excess, Arterial -3.5 (L) mmol/L      O2 Saturation, Arterial 88.0 (L) %      Temperature 37.0 C      Barometric Pressure for Blood Gas 750 mmHg      Modality Room Air     Ventilator Mode NA     Collected by [id number]    CBC & Differential [[id number]] Collected:  03/15/18 1202    Specimen:  Blood Updated:  03/15/18 1219    Narrative:       The following orders were created for panel order CBC & Differential.  Procedure                               Abnormality         Status                     ---------                               -----------         ------                     CBC Auto Differential[[id number]]        Abnormal            Final result                 Please view results for these tests on the individual orders.    Comprehensive Metabolic Panel [[id number]]  (Abnormal) Collected:  03/15/18 1202    Specimen:  Blood Updated:  03/15/18 1249     Glucose 109 (H) mg/dL      BUN 36 (H) mg/dL      Comment: Specimen hemolyzed. Results may be affected.        Creatinine 1.93 (H) mg/dL      Sodium 145 mmol/L      Potassium 5.6 (H) mmol/L      Comment: Specimen hemolyzed.  Results may be affected.        Chloride 108 mmol/L      CO2 19.0 (L) mmol/L      Calcium 9.7 mg/dL      Total Protein 10.4 (H) g/dL      Albumin 4.20 g/dL      ALT (SGPT) 34 U/L      Comment: Specimen hemolyzed.  Results may be affected.        AST (SGOT) 74 (H) U/L      Comment: Specimen hemolyzed.  Results may be affected.        Alkaline Phosphatase 96 U/L      Comment: Specimen hemolyzed. Results may be affected.        Total Bilirubin 1.0 mg/dL      eGFR  African Amer 30 (L) mL/min/1.73      Globulin 6.2 gm/dL      A/G Ratio 0.7 (L)  g/dL      BUN/Creatinine Ratio 18.7     Anion Gap 18.0 (H) mmol/L     Narrative:       The MDRD GFR formula is only valid for adults with stable renal function between ages 18 and 70.    Protime-INR [[id number]]  (Abnormal) Collected:  03/15/18 1202    Specimen:  Blood Updated:  03/15/18 1228     Protime 12.3 Seconds      INR 0.89 (L)    Lipase [[id number]]  (Normal) Collected:  03/15/18 1202    Specimen:  Blood Updated:  03/15/18 1236     Lipase 148 U/L     Troponin [[id number]]  (Normal) Collected:  03/15/18 1202    Specimen:  Blood Updated:  03/15/18 1248     Troponin I 0.016 ng/mL     Blood Culture - Blood, [[id number]] Collected:  03/15/18 1202    Specimen:  Blood from Arm, Right Updated:  03/15/18 1218    Blood Culture - Blood, [[id number]] Collected:  03/15/18 1202    Specimen:  Blood from Arm, Left Updated:  03/15/18 1219    Lactic Acid, Plasma [[id number]]  (Abnormal) Collected:  03/15/18 1202    Specimen:  Blood Updated:  03/15/18 1249     Lactate 2.9 (C) mmol/L     TSH [[id number]]  (Abnormal) Collected:  03/15/18 1202    Specimen:  Blood Updated:  03/15/18 1307     TSH 52.900 (H) mIU/mL     CBC Auto Differential [[id number]]  (Abnormal) Collected:  03/15/18 1202    Specimen:  Blood Updated:  03/15/18 1219     WBC 7.46 10*3/mm3      RBC 3.55 (L) 10*6/mm3      Hemoglobin 12.0 g/dL      Hematocrit 36.8 (L) %      .7 (H) fL      MCH 33.8 (H) pg      MCHC 32.6 (L) g/dL      RDW 14.0 %      RDW-SD 53.3 fl      MPV 10.0 fL      Platelets 261 10*3/mm3      Neutrophil % 80.4 (H) %      Lymphocyte % 13.0 (L) %      Monocyte % 4.6 %      Eosinophil % 0.0 %      Basophil % 0.3 %      Immature Grans % 1.7 %      Neutrophils, Absolute 6.00 10*3/mm3      Lymphocytes, Absolute 0.97 10*3/mm3      Monocytes, Absolute 0.34 10*3/mm3      Eosinophils, Absolute 0.00 10*3/mm3      Basophils, Absolute 0.02 10*3/mm3      Immature Grans, Absolute 0.13 (H) 10*3/mm3      nRBC 0.3 (H) /100 WBC     Lactic Acid, Reflex Timer (This will  reflex a repeat order 3-3:15 hours after ordered.) [[id number]] Collected:  03/15/18 1202    Specimen:  Blood Updated:  03/15/18 1249    CK [[id number]]  (Abnormal) Collected:  03/15/18 1202    Specimen:  Blood Updated:  03/15/18 1347     Creatine Kinase 1,077 (H) U/L     Urinalysis With / Culture If Indicated - Urine, Catheter [[id number]]  (Abnormal) Collected:  03/15/18 1218    Specimen:  Urine from Urine, Catheter Updated:  03/15/18 1230     Color, UA Yellow     Appearance, UA Clear     pH, UA <=5.0     Specific Gravity, UA 1.017     Glucose, UA Negative     Ketones, UA Trace (A)     Bilirubin, UA Negative     Blood, UA Moderate (2+) (A)     Protein,  mg/dL (2+) (A)     Leuk Esterase, UA Negative     Nitrite, UA Negative     Urobilinogen, UA 0.2 E.U./dL    Urinalysis, Microscopic Only - Urine, Clean Catch [[id number]]  (Abnormal) Collected:  03/15/18 1218    Specimen:  Urine from Urine, Catheter Updated:  03/15/18 1230     RBC, UA 0-2 (A) /HPF      WBC, UA 0-2 (A) /HPF      Bacteria, UA None Seen /HPF      Squamous Epithelial Cells, UA None Seen /HPF      Hyaline Casts, UA 0-2 /LPF      Methodology Automated Microscopy          Objective   Physical Exam   Constitutional: She appears cachectic. She is cooperative.  Non-toxic appearance. She does not have a sickly appearance. She does not appear ill. No distress.   HENT:   Head: Atraumatic. Head is without abrasion and without contusion.   Right Ear: Tympanic membrane normal.   Left Ear: Tympanic membrane normal.   Nose: No septal deviation or nasal septal hematoma.   Mouth/Throat: Uvula is midline. Mucous membranes are dry. No oropharyngeal exudate, posterior oropharyngeal edema, posterior oropharyngeal erythema or tonsillar abscesses.   Eyes: EOM are normal. Pupils are equal, round, and reactive to light.   Pupils 2 mm and reactive bilaterally Minimally Reactive.  EOMI.  No Nystagmus.   Neck: Normal range of motion. Neck supple. No spinous process tenderness  present. Carotid bruit is not present. No no neck rigidity. Normal range of motion present.   Cardiovascular: Normal rate, regular rhythm, normal heart sounds and normal pulses.  Exam reveals no gallop and no friction rub.    No murmur heard.  Pulmonary/Chest: Effort normal and breath sounds normal. No tachypnea. No respiratory distress. She has no decreased breath sounds. She has no wheezes. She has no rhonchi. She has no rales.   Abdominal: Soft. Normal appearance. She exhibits no distension. There is no tenderness. There is no rigidity, no rebound, no guarding and no CVA tenderness.   Musculoskeletal: Normal range of motion. She exhibits no edema or tenderness.   Neurological: She is alert. She has normal strength. She is disoriented. She displays no tremor. No cranial nerve deficit or sensory deficit. She exhibits normal muscle tone. Coordination normal. GCS eye subscore is 4. GCS verbal subscore is 5. GCS motor subscore is 6.   Unable to tell me the year or place.  Follows commands.  Moves all 4 extremities.   Skin: Skin is warm and dry.   Dry flaky skin throughout   Psychiatric: She has a normal mood and affect.   Nursing note and vitals reviewed.      ECG 12 Lead    Date/Time: 3/15/2018 1:16 PM  Performed by: CHICA SPAULDING  Authorized by: CHICA SPAULDING   Interpreted by physician  Comparison: not compared with previous ECG   Rhythm: sinus rhythm  Comments: Poor quality ECG but normal sinus rhythm.  Rate 60, NY interval 138, QRS 68, .  No ST elevations.  No peaked T waves.  Normal QRS.    Critical Care  Performed by: CHICA SPAULDING  Authorized by: CHICA SPAULDING     Critical care provider statement:     Critical care time (minutes):  60    Critical care time was exclusive of:  Separately billable procedures and treating other patients    Critical care was necessary to treat or prevent imminent or life-threatening deterioration of the following conditions:  Dehydration and metabolic  "crisis    Critical care was time spent personally by me on the following activities:  Development of treatment plan with patient or surrogate, discussions with consultants, evaluation of patient's response to treatment, examination of patient, obtaining history from patient or surrogate, ordering and performing treatments and interventions, ordering and review of laboratory studies, ordering and review of radiographic studies, pulse oximetry and re-evaluation of patient's condition    I assumed direction of critical care for this patient from another provider in my specialty: no               XR Chest 1 View   Final Result   1. 2 cm masslike density in the medial right lung base, CT of chest   without contrast is recommended for follow-up to exclude neoplasm. A   small nodular density at the left lung base may represent a nipple   shadow. This can be evaluated on CT as well. COPD.       REPORT was flagged for attention to the emergency department.   This report was finalized on 03/15/2018 13:48 by Dr. James Solorzano MD.      CT Head Without Contrast   Final Result   1. Small bilateral subdural collections, attenuation slightly greater   than CSF, suspect chronic subdural hemorrhagic change   2. No acute hemorrhage..   3. Atrophy and chronic ischemic white matter change.                   This report was finalized on 03/15/2018 13:14 by Dr. Trevor Amaya MD.          /50   Pulse 78   Temp (!) 91.5 °F (33.1 °C) (Rectal)   Resp 19   Ht 157.5 cm (62\")   Wt 40.8 kg (90 lb)   SpO2 96%   BMI 16.46 kg/m²     ED Course    ED Course   Comment By Time   This is an 84-year-old female who presents in the setting of fall and altered mental status.  History of hypothyroid on Synthroid.  Her TSH is 52, CK 1000 creatinine 1.93.  Potassium is 5.6.  No EKG changes.  Troponin 0.016.  Head CT does show bilateral subdural collections without midline shift.  These do appear chronic.  I did speak to Dr. Stephens who will place a " consult note that recommends nothing at this point.  Chest x-ray does show density in the right lung concerning for mass.  CT without contrast recommended.  I did attempt to tell the family this, however the son was gone when I went back into the room.  I did let the hospitalist no however.  Initially, patient's heart rate was in the 60s and temperature was hypothermic.  She was given warming devices, warm blankets, warmed fluids.  We have given her 2 liters of fluid.  Her lactate was initially 2.9 as well.  We did give her levothyroxine and 4 mg of IV Decadron.  Patient will require admission to the ICU for further care. Raj Koroma MD 03/15 1407   She did receive insulin and glucose along with Kayexalate for hyperkalemia. Raj Koroma MD 03/15 1424       Medications   sodium chloride 0.9 % flush 10 mL (not administered)   sodium chloride 0.9 % bolus 1,000 mL (1,000 mL Intravenous New Bag 3/15/18 1210)   insulin regular (humuLIN R,novoLIN R) injection 10 Units (10 Units Intravenous Given 3/15/18 1349)   dextrose (D50W) solution 25 g (25 g Intravenous Given 3/15/18 1341)   sodium polystyrene (KAYEXALATE) 15 GM/60ML suspension 15 g (15 g Oral Given 3/15/18 1350)   levothyroxine sodium injection 200 mcg (200 mcg Intravenous Given 3/15/18 1344)   dexamethasone (DECADRON) injection 4 mg (4 mg Intravenous Given 3/15/18 1340)            MDM  Number of Diagnoses or Management Options  SHARRI (acute kidney injury): new and requires workup  Altered mental status, unspecified altered mental status type: new and requires workup  Bilateral subdural hematomas: new and requires workup  Elevated TSH: new and requires workup  Hypothermia, initial encounter: new and requires workup  Lung mass: new and requires workup  Non-traumatic rhabdomyolysis: new and requires workup     Amount and/or Complexity of Data Reviewed  Clinical lab tests: reviewed and ordered  Tests in the radiology section of CPT®: ordered and  reviewed  Decide to obtain previous medical records or to obtain history from someone other than the patient: yes  Review and summarize past medical records: yes  Discuss the patient with other providers: yes    Risk of Complications, Morbidity, and/or Mortality  Presenting problems: high  Diagnostic procedures: high  Management options: high    Critical Care  Total time providing critical care: 30-74 minutes    Patient Progress  Patient progress: stable      Final diagnoses:   Elevated TSH   Altered mental status, unspecified altered mental status type   Hypothermia, initial encounter   Non-traumatic rhabdomyolysis   SHARRI (acute kidney injury)   Bilateral subdural hematomas   Lung mass   Hyperkalemia          Raj Koroma MD  03/15/18 2987

## 2018-03-15 NOTE — PLAN OF CARE
Problem: Patient Care Overview  Goal: Individualization and Mutuality  Outcome: Ongoing (interventions implemented as appropriate)    Goal: Discharge Needs Assessment  Outcome: Ongoing (interventions implemented as appropriate)    Goal: Interprofessional Rounds/Family Conf  Outcome: Ongoing (interventions implemented as appropriate)      Problem: Fall Risk (Adult)  Goal: Identify Related Risk Factors and Signs and Symptoms  Outcome: Ongoing (interventions implemented as appropriate)    Goal: Absence of Fall  Outcome: Ongoing (interventions implemented as appropriate)

## 2018-03-16 ENCOUNTER — APPOINTMENT (OUTPATIENT)
Dept: CARDIOLOGY | Facility: HOSPITAL | Age: 83
End: 2018-03-16
Attending: FAMILY MEDICINE

## 2018-03-16 ENCOUNTER — APPOINTMENT (OUTPATIENT)
Dept: CT IMAGING | Facility: HOSPITAL | Age: 83
End: 2018-03-16

## 2018-03-16 ENCOUNTER — APPOINTMENT (OUTPATIENT)
Dept: MRI IMAGING | Facility: HOSPITAL | Age: 83
End: 2018-03-16

## 2018-03-16 LAB
ALBUMIN SERPL-MCNC: 3.5 G/DL (ref 3.5–5)
ALBUMIN/GLOB SERPL: 0.7 G/DL (ref 1.1–2.5)
ALP SERPL-CCNC: 69 U/L (ref 24–120)
ALT SERPL W P-5'-P-CCNC: 31 U/L (ref 0–54)
ANION GAP SERPL CALCULATED.3IONS-SCNC: 14 MMOL/L (ref 4–13)
AST SERPL-CCNC: 57 U/L (ref 7–45)
BASOPHILS # BLD AUTO: 0.01 10*3/MM3 (ref 0–0.2)
BASOPHILS NFR BLD AUTO: 0.1 % (ref 0–2)
BILIRUB SERPL-MCNC: 0.5 MG/DL (ref 0.1–1)
BUN BLD-MCNC: 34 MG/DL (ref 5–21)
BUN/CREAT SERPL: 18.7 (ref 7–25)
CALCIUM SPEC-SCNC: 8.5 MG/DL (ref 8.4–10.4)
CHLORIDE SERPL-SCNC: 114 MMOL/L (ref 98–110)
CK SERPL-CCNC: 693 U/L (ref 0–203)
CO2 SERPL-SCNC: 22 MMOL/L (ref 24–31)
CREAT BLD-MCNC: 1.82 MG/DL (ref 0.5–1.4)
DEPRECATED RDW RBC AUTO: 51.7 FL (ref 40–54)
EOSINOPHIL # BLD AUTO: 0 10*3/MM3 (ref 0–0.7)
EOSINOPHIL NFR BLD AUTO: 0 % (ref 0–4)
ERYTHROCYTE [DISTWIDTH] IN BLOOD BY AUTOMATED COUNT: 13.8 % (ref 12–15)
GFR SERPL CREATININE-BSD FRML MDRD: 32 ML/MIN/1.73
GLOBULIN UR ELPH-MCNC: 5.1 GM/DL
GLUCOSE BLD-MCNC: 219 MG/DL (ref 70–100)
GLUCOSE BLDC GLUCOMTR-MCNC: 208 MG/DL (ref 70–130)
GLUCOSE BLDC GLUCOMTR-MCNC: 224 MG/DL (ref 70–130)
HCT VFR BLD AUTO: 33.2 % (ref 37–47)
HGB BLD-MCNC: 11 G/DL (ref 12–16)
IMM GRANULOCYTES # BLD: 0.1 10*3/MM3 (ref 0–0.03)
IMM GRANULOCYTES NFR BLD: 1.4 % (ref 0–5)
LYMPHOCYTES # BLD AUTO: 0.61 10*3/MM3 (ref 0.72–4.86)
LYMPHOCYTES NFR BLD AUTO: 8.7 % (ref 15–45)
MCH RBC QN AUTO: 33.6 PG (ref 28–32)
MCHC RBC AUTO-ENTMCNC: 33.1 G/DL (ref 33–36)
MCV RBC AUTO: 101.5 FL (ref 82–98)
MONOCYTES # BLD AUTO: 0.16 10*3/MM3 (ref 0.19–1.3)
MONOCYTES NFR BLD AUTO: 2.3 % (ref 4–12)
NEUTROPHILS # BLD AUTO: 6.16 10*3/MM3 (ref 1.87–8.4)
NEUTROPHILS NFR BLD AUTO: 87.5 % (ref 39–78)
NRBC BLD MANUAL-RTO: 0 /100 WBC (ref 0–0)
PLATELET # BLD AUTO: 245 10*3/MM3 (ref 130–400)
PMV BLD AUTO: 10.4 FL (ref 6–12)
POTASSIUM BLD-SCNC: 4.1 MMOL/L (ref 3.5–5.3)
PREALB SERPL-MCNC: 6.3 MG/DL (ref 18–36)
PROT SERPL-MCNC: 8.6 G/DL (ref 6.3–8.7)
RBC # BLD AUTO: 3.27 10*6/MM3 (ref 4.2–5.4)
SODIUM BLD-SCNC: 150 MMOL/L (ref 135–145)
WBC NRBC COR # BLD: 7.04 10*3/MM3 (ref 4.8–10.8)

## 2018-03-16 PROCEDURE — 82550 ASSAY OF CK (CPK): CPT | Performed by: FAMILY MEDICINE

## 2018-03-16 PROCEDURE — 25010000002 LORAZEPAM PER 2 MG: Performed by: FAMILY MEDICINE

## 2018-03-16 PROCEDURE — 72125 CT NECK SPINE W/O DYE: CPT

## 2018-03-16 PROCEDURE — 25010000002 HYDRALAZINE PER 20 MG: Performed by: FAMILY MEDICINE

## 2018-03-16 PROCEDURE — 93306 TTE W/DOPPLER COMPLETE: CPT

## 2018-03-16 PROCEDURE — 82533 TOTAL CORTISOL: CPT | Performed by: FAMILY MEDICINE

## 2018-03-16 PROCEDURE — 70551 MRI BRAIN STEM W/O DYE: CPT

## 2018-03-16 PROCEDURE — 92526 ORAL FUNCTION THERAPY: CPT

## 2018-03-16 PROCEDURE — 85025 COMPLETE CBC W/AUTO DIFF WBC: CPT | Performed by: FAMILY MEDICINE

## 2018-03-16 PROCEDURE — 99231 SBSQ HOSP IP/OBS SF/LOW 25: CPT | Performed by: NEUROLOGICAL SURGERY

## 2018-03-16 PROCEDURE — 80053 COMPREHEN METABOLIC PANEL: CPT | Performed by: FAMILY MEDICINE

## 2018-03-16 PROCEDURE — 84134 ASSAY OF PREALBUMIN: CPT | Performed by: FAMILY MEDICINE

## 2018-03-16 PROCEDURE — 93306 TTE W/DOPPLER COMPLETE: CPT | Performed by: INTERNAL MEDICINE

## 2018-03-16 PROCEDURE — 25010000002 METHYLPREDNISOLONE PER 125 MG: Performed by: FAMILY MEDICINE

## 2018-03-16 PROCEDURE — 82962 GLUCOSE BLOOD TEST: CPT

## 2018-03-16 RX ORDER — LORAZEPAM 2 MG/ML
0.5 INJECTION INTRAMUSCULAR ONCE
Status: COMPLETED | OUTPATIENT
Start: 2018-03-16 | End: 2018-03-16

## 2018-03-16 RX ORDER — LORAZEPAM 2 MG/ML
0.5 INJECTION INTRAMUSCULAR ONCE
Status: DISCONTINUED | OUTPATIENT
Start: 2018-03-16 | End: 2018-03-20 | Stop reason: HOSPADM

## 2018-03-16 RX ORDER — DEXTROSE AND SODIUM CHLORIDE 5; .45 G/100ML; G/100ML
100 INJECTION, SOLUTION INTRAVENOUS CONTINUOUS
Status: DISCONTINUED | OUTPATIENT
Start: 2018-03-16 | End: 2018-03-20 | Stop reason: HOSPADM

## 2018-03-16 RX ORDER — HYDRALAZINE HYDROCHLORIDE 20 MG/ML
10 INJECTION INTRAMUSCULAR; INTRAVENOUS EVERY 6 HOURS PRN
Status: DISCONTINUED | OUTPATIENT
Start: 2018-03-16 | End: 2018-03-20 | Stop reason: HOSPADM

## 2018-03-16 RX ORDER — METHYLPREDNISOLONE SODIUM SUCCINATE 125 MG/2ML
80 INJECTION, POWDER, LYOPHILIZED, FOR SOLUTION INTRAMUSCULAR; INTRAVENOUS EVERY 8 HOURS
Status: DISCONTINUED | OUTPATIENT
Start: 2018-03-16 | End: 2018-03-18

## 2018-03-16 RX ORDER — LEVOTHYROXINE SODIUM ANHYDROUS 100 UG/5ML
150 INJECTION, POWDER, LYOPHILIZED, FOR SOLUTION INTRAVENOUS
Status: DISCONTINUED | OUTPATIENT
Start: 2018-03-16 | End: 2018-03-20 | Stop reason: HOSPADM

## 2018-03-16 RX ADMIN — METHYLPREDNISOLONE SODIUM SUCCINATE 80 MG: 125 INJECTION, POWDER, FOR SOLUTION INTRAMUSCULAR; INTRAVENOUS at 11:55

## 2018-03-16 RX ADMIN — DEXTROSE AND SODIUM CHLORIDE 100 ML/HR: 5; 450 INJECTION, SOLUTION INTRAVENOUS at 11:54

## 2018-03-16 RX ADMIN — DEXTROSE AND SODIUM CHLORIDE 100 ML/HR: 5; 450 INJECTION, SOLUTION INTRAVENOUS at 21:12

## 2018-03-16 RX ADMIN — METHYLPREDNISOLONE SODIUM SUCCINATE 80 MG: 125 INJECTION, POWDER, FOR SOLUTION INTRAMUSCULAR; INTRAVENOUS at 18:48

## 2018-03-16 RX ADMIN — LORAZEPAM 0.5 MG: 2 INJECTION INTRAMUSCULAR; INTRAVENOUS at 00:32

## 2018-03-16 RX ADMIN — LORAZEPAM 0.5 MG: 2 INJECTION INTRAMUSCULAR; INTRAVENOUS at 04:46

## 2018-03-16 RX ADMIN — LEVOTHYROXINE SODIUM ANHYDROUS 150 MCG: 100 INJECTION, POWDER, LYOPHILIZED, FOR SOLUTION INTRAVENOUS at 12:16

## 2018-03-16 RX ADMIN — Medication 10 MG: at 17:58

## 2018-03-16 RX ADMIN — METHYLPREDNISOLONE SODIUM SUCCINATE 125 MG: 125 INJECTION, POWDER, FOR SOLUTION INTRAMUSCULAR; INTRAVENOUS at 02:35

## 2018-03-16 RX ADMIN — Medication 10 MG: at 11:54

## 2018-03-16 NOTE — PROGRESS NOTES
[facility] Medicine Services  INPATIENT PROGRESS NOTE    Length of Stay: 1  Date of Admission: 3/15/2018  Primary Care Physician: Jorgito Muñoz DO    Subjective   Chief Complaint: Unable confused.  HPI   Unable confused.     Per nursing pulling at lines   Soft wrist restraints needed.  Son did come in while I was at bedside and notes she is a DNR.  He also notes she does not take her medication at all.  See Toni and uses [facility] at [facility].    Unable to find CT of neck, discussed with radiology department they will send for now.     Review of Systems   Unable confused.      Objective    Temp:  [91.5 °F (33.1 °C)-98 °F (36.7 °C)] 97.2 °F (36.2 °C)  Heart Rate:  [53-96] 76  Resp:  [12-21] 15  BP: ()/(37-93) 134/51  Physical Exam   Constitutional: She appears well-developed.   HENT:   Head: Normocephalic and atraumatic.   Eyes: Conjunctivae and EOM are normal. Pupils are equal, round, and reactive to light.   Neck: Neck supple. No JVD present.   Cardiovascular: Regular rhythm.  Exam reveals no gallop and no friction rub.    No murmur heard.  Bradycardic.   Pulmonary/Chest: Effort normal.   Fine crackles anterior chest bilaterally   Abdominal: Soft. Bowel sounds are normal. There is no hepatosplenomegaly. There is no tenderness.   Musculoskeletal: Normal range of motion. She exhibits no edema.   Soft wrist restraints    Neurological: No cranial nerve deficit.   No focal deficit  Confused.   Skin: Skin is warm and dry.   Psychiatric:   Confused.      Nursing note and vitals reviewed.          Results Review:  I have reviewed the labs, radiology results, and diagnostic studies.    Laboratory Data:     Results from last 7 days  Lab Units 03/16/18  0443 03/15/18  1202   WBC 10*3/mm3 7.04 7.46   HEMOGLOBIN g/dL 11.0* 12.0   HEMATOCRIT % 33.2* 36.8*   PLATELETS 10*3/mm3 245 261          Results from last 7 days  Lab Units 03/16/18  0443 03/15/18  1836 03/15/18  1202    SODIUM mmol/L 150* 146* 145   POTASSIUM mmol/L 4.1 4.4 5.6*   CHLORIDE mmol/L 114* 112* 108   CO2 mmol/L 22.0* 25.0 19.0*   BUN mg/dL 34* 36* 36*   CREATININE mg/dL 1.82* 2.07* 1.93*   CALCIUM mg/dL 8.5 8.7 9.7   BILIRUBIN mg/dL 0.5  --  1.0   ALK PHOS U/L 69  --  96   ALT (SGPT) U/L 31  --  34   AST (SGOT) U/L 57*  --  74*   GLUCOSE mg/dL 219* 56* 109*       Culture Data:   Blood Culture   Date Value Ref Range Status   03/15/2018 No growth at less than 24 hours  Preliminary   03/15/2018 No growth at less than 24 hours  Preliminary       Radiology Data:   Imaging Results (last 24 hours)     Procedure Component Value Units Date/Time    XR Spine Lumbar 2 or 3 View [[id number]] Collected:  03/15/18 2111     Updated:  03/15/18 2115    Narrative:       EXAMINATION: XR SPINE LUMBAR 2 OR 3 VW-     3/15/2018 8:53 PM CDT     HISTORY: fall; R94.6-Abnormal results of thyroid function studies;  R13.12-Dysphagia, oropharyngeal phase; R41.82-Altered mental status,  unspecified; T68.XXXA-Hypothermia, initial encounter;  M62.82-Rhabdomyolysis; N17.9-Acute kidney failure, unspecified;  S06.6M8R-Omhulyesb subdural hemorrhage with loss of consciousness of  unspecified duration, initial encounter; R91.8-Other nonspecific  abnormal finding of .     Lumbar spine series, 2 views.     Diffuse osteopenia.     Symmetric SI joints.     Lordotic curvature is appropriate.  Mild endplate spurring.  No compression fracture.     Summary:  1. No acute bony abnormality.  This report was finalized on 03/15/2018 21:12 by Dr. Saurabh Black MD.    XR Spine Thoracic 2 View [[id number]] Collected:  03/15/18 2110     Updated:  03/15/18 2114    Narrative:       EXAMINATION: XR SPINE THORACIC 2 VW-     3/15/2018 8:50 PM CDT     HISTORY: fall; R94.6-Abnormal results of thyroid function studies;  R13.12-Dysphagia, oropharyngeal phase; R41.82-Altered mental status,  unspecified; T68.XXXA-Hypothermia, initial encounter;  M62.82-Rhabdomyolysis; N17.9-Acute kidney  failure, unspecified;  S06.6A9E-Xguzqvvyp subdural hemorrhage with loss of consciousness of  unspecified duration, initial encounter; R91.8-Other nonspecific  abnormal finding of .     Three-view thoracic spine series.     Prominent disc space narrowing and endplate spurring throughout the mid  cervical spine.     No significant thoracic scoliosis.  Diffuse osteopenia.     Thoracic lordosis is appropriate.     Mild disc space narrowing and endplate spurring.     No acute fracture is seen.     Summary:  1. No evidence of thoracic spine fracture.  This report was finalized on 03/15/2018 21:11 by Dr. Saurabh Black MD.    XR Chest 1 View [[id number]] Collected:  03/15/18 1343     Updated:  03/15/18 1351    Narrative:       EXAMINATION: XR CHEST 1 VW- 3/15/2018 1:43 PM CDT     HISTORY: AMS.     REPORT: Comparison is made with the study from 1/11/2015.     The lungs are hyperinflated, no infiltrate is identified. There is a  small nodular density at the left base which is probably a nipple  shadow. There is a small masslike density in the medial right lung base  that measures approximately 2 cm. Heart size is normal. No pneumothorax  or pleural effusion is identified. The osseous structures and upper  abdomen appear unremarkable.       Impression:       1. 2 cm masslike density in the medial right lung base, CT of chest  without contrast is recommended for follow-up to exclude neoplasm. A  small nodular density at the left lung base may represent a nipple  shadow. This can be evaluated on CT as well. COPD.     REPORT was flagged for attention to the emergency department.  This report was finalized on 03/15/2018 13:48 by Dr. James Solorzano MD.    CT Head Without Contrast [[id number]] Collected:  03/15/18 1306     Updated:  03/15/18 1317    Narrative:       EXAMINATION: CT HEAD WO CONTRAST-  3/15/2018 1:06 PM CDT     CT SCAN OF THE HEAD, WITHOUT CONTRAST:      HISTORY: Confusion/delirium, altered level of consciousness,  unexplained     COMPARISONS: 1/11/2015 head CT      TECHNIQUE:     Radiation dose equals DLP 1115 mGy-cm.  Automated exposure control dose  reduction technique was implemented.        CT evaluation of the head without intravenous contrast. 5 mm transaxial  images were obtained.   2-D sagittal and coronal reconstruction images  were generated.     FINDINGS: Image quality degradation related to streak-like motion  artifact appreciated.     There is diffuse central and cortical atrophy.     There is low-attenuation in the periventricular and subcortical white  matter compatible with chronic ischemic changes.     There are small bilateral subdural collections, slightly greater  attenuation than CSF. Suspect chronic subdural hemorrhagic changes. The  subdural collection along the right convexity is slightly greater  measuring nearly 5 mm in thickness. The left subdural collection  measures approximately 3.5 mm in thickness along the convexity.     There is no intraaxial hemorrhage. There is no acute extra-axial  hemorrhage.     There is no mass effect or midline shift. There is no hydrocephalus.     Bone window imaging reveals no calvarial abnormality.     Paranasal sinuses imaged in part are clear.          Impression:       1. Small bilateral subdural collections, attenuation slightly greater  than CSF, suspect chronic subdural hemorrhagic change  2. No acute hemorrhage..  3. Atrophy and chronic ischemic white matter change.              This report was finalized on 03/15/2018 13:14 by Dr. Trevor Amaya MD.          I have reviewed the patient current medications.     Assessment/Plan     Hospital Problem List     Elevated TSH            Metabolic encephalopathy  Per family has been living by self and doing fine.  Markedly elevated TSH in a patient with known hypothyroidism. Does not take medication  Hypertension.  Patient is on lisinopril, per son does not take medication  Abnormal CT head, bilateral subdural v  hygromas  Fall at home, patient found in floor.  No evaluation of cervical spine done in ER  Will CT neck.   Abnormal chest xray will need outpatient follow up with non contrasted CT of chest.   Hypothermia, resolved    Plan    CT C spine  Chest xray for abnl lung sounds  MRI head pending  Change Levothyroxine to IV  BMP in AM  Neurosurgery following abnormal head scan.  Social service consult for NH placement    Discharge Planning: I expect the patient to be discharged to SNF in 5-7 days.    Cassie Matthews,    03/16/18   9:15 AM

## 2018-03-16 NOTE — THERAPY TREATMENT NOTE
Acute Care - Speech Language Pathology   Swallow Treatment Note [facility]     Patient Name: Saúl Santiago  : 1933  MRN: [id number]  Today's Date: 3/16/2018               Admit Date: 3/15/2018  Pt unable to alert to cues as well today as she did yesterday. She kept her eyes closed most of the time and had minimal response to ice or spoon being presented to her lips. She initiated one swallow with max verbal cues, but was unable to elicit a swallow on any other opportunity. Pt not felt safe for PO at this time due to cognitive status. Recommend NPO. SLP will follow up tomorrow or when pt more alert. If pt still not appropriate for PO tomorrow, she may benefit from temporary ALEJANDRA if aggressive measures desired.   AMY Carrizales 3/16/2018 9:13 AM    Visit Dx:      ICD-10-CM ICD-9-CM   1. Elevated TSH R94.6 794.5   2. Oropharyngeal dysphagia R13.12 787.22   3. Altered mental status, unspecified altered mental status type R41.82 780.97   4. Hypothermia, initial encounter T68.XXXA 991.6   5. Non-traumatic rhabdomyolysis M62.82 728.88   6. SHARRI (acute kidney injury) N17.9 584.9   7. Bilateral subdural hematomas S06.5X9A 432.1   8. Lung mass R91.8 786.6   9. Hyperkalemia E87.5 276.7     Patient Active Problem List   Diagnosis   • Vitamin D deficiency   • Losing weight   • Hypoglycemia   • Essential hypertension   • Mixed hyperlipidemia   • Acquired hypothyroidism   • Diabetes mellitus   • Chronic kidney disease, stage III (moderate)   • COPD (chronic obstructive pulmonary disease)   • Chronic constipation   • Arthritis   • Abnormal SPEP   • Macrocytic anemia   • Tobacco abuse   • Vascular dementia without behavioral disturbance   • Elevated TSH       Therapy Treatment    Therapy Treatment / Health Promotion    Treatment Time/Intention  Discipline: speech language pathologist (AMY Douglas)  Document Type: therapy note (daily note) (AMY Douglas)  Mode of Treatment:  speech-language pathology (Sridhar Levy CCC-SLP)  Patient Effort: poor (Sridhar Levy CCC-SLP)  Plan of Care Review  Plan of Care Reviewed With: caregiver, son (Sridhar Levy CCC-SLP)    Vitals/Pain/Safety  Pain Scale: FACES Pre/Post-Treatment  Pain: FACES Scale, Pretreatment: 2-->hurts little bit (Sridhar Levy CCC-SLP)    Cognition, Communication, Swallow  Recommendations  Anticipated Dischage Disposition: skilled nursing facility (Sridhar Levy CCC-SLP)    Outcome Summary  Outcome Summary/Treatment Plan (SLP)  Daily Summary of Progress (SLP): unable to show any progress toward functional goals (Sridhar Levy CCC-SLP)  Barriers to Overall Progress (SLP): n/a (Sridhar Levy CCC-SLP)  Plan for Continued Treatment (SLP): continue (SANJIV DouglasSLP)  Anticipated Dischage Disposition: skilled nursing facility (SANJIV DouglasSLP)            SLP GOALS     Row Name 03/16/18 0846 03/15/18 1301          Oral Nutrition/Hydration Goal 1 (SLP)    Oral Nutrition/Hydration Goal 1, SLP Pt will tolerate least restrictive diet with no overt s/s of aspiration.  -MB Pt will tolerate least restrictive diet with no overt s/s of aspiration.  -MB     Time Frame (Oral Nutrition/Hydration Goal 1, SLP) short term goal (STG);by discharge  -MB short term goal (STG);by discharge  -MB     Barriers (Oral Nutrition/Hydration Goal 1, SLP) cognitive status  -MB n/a  -MB     Progress/Outcomes (Oral Nutrition/Hydration Goal 1, SLP) unable to make needed progress;goal ongoing  -MB goal ongoing  -MB        Lingual Strengthening Goal 1 (SLP)    Activity (Lingual Strengthening Goal 1, SLP) increase lingual tone/sensation/control/coordination/movement;increase tongue back strength  -MB increase lingual tone/sensation/control/coordination/movement;increase tongue back strength  -MB     Increase Lingual Tone/Sensation/Control/Coordination/Movement lingual resistance exercises  -MB lingual resistance  exercises  -MB     Increase Tongue Back Strength lingual movement exercises  -MB lingual movement exercises  -MB     [place]/Accuracy (Lingual Strengthening Goal 1, SLP) independently (over 90% accuracy)  -MB independently (over 90% accuracy)  -MB     Time Frame (Lingual Strengthening Goal 1, SLP) short term goal (STG);by discharge  -MB short term goal (STG);by discharge  -MB     Barriers (Lingual Strengthening Goal 1, SLP) n/a  -MB n/a  -MB     Progress/Outcomes (Lingual Strengthening Goal 1, SLP) goal ongoing  -MB goal ongoing  -MB        Pharyngeal Strengthening Exercise Goal 1 (SLP)    Activity (Pharyngeal Strengthening Goal 1, SLP) increase timing;increase squeeze/positive pressure generation;increase tongue base retraction  -MB increase timing;increase squeeze/positive pressure generation;increase tongue base retraction  -MB     Increase Timing gustatory stimulation (sour/cold)  -MB gustatory stimulation (sour/cold)  -MB     Increase Squeeze/Positive Pressure Generation hard effortful swallow  -MB hard effortful swallow  -MB     Increase Tongue Base Retraction keira  -MB keira  -MB     [place]/Accuracy (Pharyngeal Strengthening Goal 1, SLP) independently (over 90% accuracy)  -MB independently (over 90% accuracy)  -MB     Time Frame (Pharyngeal Strengthening Goal 1, SLP) short term goal (STG);by discharge  -MB short term goal (STG);by discharge  -MB     Barriers (Pharyngeal Strengthening Goal 1, SLP) n/a  -MB n/a  -MB     Progress/Outcomes (Pharyngeal Strengthening Goal 1, SLP) unable to make needed progress;goal ongoing  -MB goal ongoing  -MB       User Key  (r) = Recorded By, (t) = Taken By, (c) = Cosigned By    Initials Name Provider Type    SANTY Levy CCC-SLP Speech and Language Pathologist          EDUCATION  The patient has been educated in the following areas:   Dysphagia (Swallowing Impairment).    SLP Recommendation and Plan                       Anticipated Dischage Disposition:  skilled nursing facility                Plan of Care Reviewed With: caregiver, son  Plan of Care Review  Plan of Care Reviewed With: caregiver, son  Daily Summary of Progress (SLP): unable to show any progress toward functional goals  Plan for Continued Treatment (SLP): continue  Progress: declining  Outcome Summary: Pt unable to alert to cues as well today as she did yesterday. She kept her eyes closed most of the time and had minimal response to ice or spoon being presented to her lips. She initiated one swallow with max verbal cues, but was unable to elicit a swallow on any other opportunity. Pt not felt safe for PO at this time due to cognitive status. Recommend NPO. SLP will follow up tomorrow or when pt more alert. If pt still not appropriate for PO tomorrow, she may benefit from temporary ALEJANDRA if aggressive measures desired.        SLP Outcome Measures (last 72 hours)      SLP Outcome Measures     Row Name 03/15/18 1400             SLP Outcome Measures    Outcome Measure Used? Adult NOMS  -MB         FCM Scores    FCM Chosen Swallowing  -MB      Swallowing FCM Score 4  -MB        User Key  (r) = Recorded By, (t) = Taken By, (c) = Cosigned By    Initials Name Effective Dates    AMY Pacheco 08/02/16 -              Time Calculation:         Time Calculation- SLP     Row Name 03/16/18 0912             Time Calculation- SLP    SLP Start Time 0846  -MB      SLP Stop Time 0905  -MB      SLP Time Calculation (min) 19 min  -MB      SLP Received On 03/16/18  -MB        User Key  (r) = Recorded By, (t) = Taken By, (c) = Cosigned By    Initials Name Provider Type    AMY Pacheco Speech and Language Pathologist          Therapy Charges for Today     Code Description Service Date Service Provider Modifiers Qty    21577266932 HC ST SWALLOWING CURRENT STATUS 3/15/2018 AMY Douglas, CK 1    17257225589 HC ST SWALLOWING PROJECTED 3/15/2018 AMY Douglas, CK 1     [id number]  ST EVAL ORAL PHARYNG SWALLOW 4 3/15/2018 AMY Douglas GN 1    [id number]  ST TREATMENT SWALLOW 1 3/16/2018 AMY Douglas GN 1          SLP G-Codes  SLP NOMS Used?: Yes  Functional Limitations: Swallowing  Swallow Current Status (): At least 40 percent but less than 60 percent impaired, limited or restricted  Swallow Goal Status (): At least 40 percent but less than 60 percent impaired, limited or restricted      AMY Carrizales  3/16/2018

## 2018-03-16 NOTE — PLAN OF CARE
Problem: Patient Care Overview  Goal: Plan of Care Review  Outcome: Ongoing (interventions implemented as appropriate)   03/16/18 9109   OTHER   Outcome Summary NPO, failed bedside dysphagia evaluation. If unable to advance and tolerate oral diet, may benefit from amons. con to follow       Problem: Nutrition, Imbalanced: Inadequate Oral Intake (Adult)  Goal: Identify Related Risk Factors and Signs and Symptoms  Outcome: Ongoing (interventions implemented as appropriate)

## 2018-03-16 NOTE — PLAN OF CARE
Problem: Patient Care Overview  Goal: Individualization and Mutuality  Outcome: Ongoing (interventions implemented as appropriate)    Goal: Discharge Needs Assessment  Outcome: Ongoing (interventions implemented as appropriate)    Goal: Interprofessional Rounds/Family Conf  Outcome: Ongoing (interventions implemented as appropriate)   03/16/18 1808   Interdisciplinary Rounds/Family Conf   Summary family aware patient will need nursing home placement after discharge for inability to care for herself at home alone. Son in agreeance. Vss. Patient neruo status unchanged, purposeful movements, pick lines and pulls oxygen off., does not follow commads but moves all extremities well. Oriented to persosn only.    Participants family       Problem: Fall Risk (Adult)  Goal: Identify Related Risk Factors and Signs and Symptoms  Outcome: Ongoing (interventions implemented as appropriate)    Goal: Absence of Fall  Outcome: Ongoing (interventions implemented as appropriate)      Problem: Skin Injury Risk (Adult)  Goal: Identify Related Risk Factors and Signs and Symptoms  Outcome: Ongoing (interventions implemented as appropriate)    Goal: Skin Health and Integrity  Outcome: Ongoing (interventions implemented as appropriate)      Problem: Restraint, Nonbehavioral (Nonviolent)  Goal: Rationale and Justification  Outcome: Ongoing (interventions implemented as appropriate)    Goal: Nonbehavioral (Nonviolent) Restraint: Absence of Injury/Harm  Outcome: Ongoing (interventions implemented as appropriate)    Goal: Nonbehavioral (Nonviolent) Restraint: Achievement of Discontinuation Criteria  Outcome: Ongoing (interventions implemented as appropriate)    Goal: Nonbehavioral (Nonviolent) Restraint: Preservation of Dignity and Wellbeing  Outcome: Ongoing (interventions implemented as appropriate)      Problem: Confusion, Acute (Adult)  Goal: Identify Related Risk Factors and Signs and Symptoms  Outcome: Ongoing (interventions implemented  as appropriate)    Goal: Cognitive/Functional Impairments Minimized  Outcome: Ongoing (interventions implemented as appropriate)    Goal: Safety  Outcome: Ongoing (interventions implemented as appropriate)      Problem: Nutrition, Imbalanced: Inadequate Oral Intake (Adult)  Goal: Identify Related Risk Factors and Signs and Symptoms  Outcome: Ongoing (interventions implemented as appropriate)

## 2018-03-16 NOTE — PLAN OF CARE
Problem: Patient Care Overview  Goal: Plan of Care Review  Outcome: Ongoing (interventions implemented as appropriate)   03/16/18 0908   Coping/Psychosocial   Plan of Care Reviewed With caregiver;son   OTHER   Outcome Summary Pt unable to alert to cues as well today as she did yesterday. She kept her eyes closed most of the time and had minimal response to ice or spoon being presented to her lips. She initiated one swallow with max verbal cues, but was unable to elicit a swallow on any other opportunity. Pt not felt safe for PO at this time due to cognitive status. Recommend NPO. SLP will follow up tomorrow or when pt more alert. If pt still not appropriate for PO tomorrow, she may benefit from temporary ALEJANDRA if aggressive measures desired.    Plan of Care Review   Progress declining

## 2018-03-16 NOTE — PROGRESS NOTES
Discharge Planning Assessment  [facility]     Patient Name: Saúl Santiago  MRN: [id number]  Today's Date: 3/16/2018    Admit Date: 3/15/2018          Discharge Needs Assessment     Row Name 03/16/18 1417       Living Environment    Lives With alone    Current Living Arrangements home/apartment/condo    Primary Care Provided by self    Provides Primary Care For no one    Family Caregiver if Needed none    Quality of Family Relationships supportive    Able to Return to Prior Arrangements no       Resource/Environmental Concerns    Resource/Environmental Concerns none    Transportation Concerns car, none       Transition Planning    Patient/Family Anticipates Transition to long term care facility    Patient/Family Anticipated Services at Transition skilled nursing    Transportation Anticipated health plan transportation       Discharge Needs Assessment    Readmission Within the Last 30 Days no previous admission in last 30 days    Concerns to be Addressed care coordination/care conferences;discharge planning    Anticipated Changes Related to Illness inability to care for self    Outpatient/Agency/Support Group Needs skilled nursing facility    Discharge Facility/Level of Care Needs nursing facility, skilled    Offered/Gave Vendor List no    Patient's Choice of Community Agency(s) [facility]    Current Discharge Risk chronically ill;dependent with mobility/activities of daily living;lives alone    Discharge Coordination/Progress Spoke to patient's son, Zack Santiago, regarding discharge plan/needs.  SW advised patient will need NHP and son was in agreement.  Son states he prefers [facility] as he and patient both reside in [place].  Will proceed with referrals when patient is more stable.            Discharge Plan    No documentation.       Destination     No service coordination in this encounter.      Durable Medical Equipment     No service coordination in this encounter.      Dialysis/Infusion     No  service coordination in this encounter.      Home Medical Care     No service coordination in this encounter.      Social Care     No service coordination in this encounter.                Demographic Summary    No documentation.           Functional Status    No documentation.           Psychosocial    No documentation.           Abuse/Neglect    No documentation.           Legal    No documentation.           Substance Abuse    No documentation.           Patient Forms    No documentation.         JOE Dc

## 2018-03-16 NOTE — PROGRESS NOTES
Saúl Santiago  84 y.o.    Subjective  No events      Temp:  [91.5 °F (33.1 °C)-98 °F (36.7 °C)] 97.2 °F (36.2 °C)  Heart Rate:  [53-96] 76  Resp:  [12-21] 15  BP: ()/(37-93) 134/51      Objective    Neurologic Exam    NAD  Awake  Confused  QUIROS  Restrained  Negative follows commands    Active Problems:    Elevated TSH      Lab Results (last 24 hours)     Procedure Component Value Units Date/Time    Comprehensive Metabolic Panel [[id number]]  (Abnormal) Collected:  03/16/18 0443    Specimen:  Blood Updated:  03/16/18 0541     Glucose 219 (H) mg/dL      BUN 34 (H) mg/dL      Creatinine 1.82 (H) mg/dL      Sodium 150 (H) mmol/L      Potassium 4.1 mmol/L      Chloride 114 (H) mmol/L      CO2 22.0 (L) mmol/L      Calcium 8.5 mg/dL      Total Protein 8.6 g/dL      Albumin 3.50 g/dL      ALT (SGPT) 31 U/L      AST (SGOT) 57 (H) U/L      Alkaline Phosphatase 69 U/L      Total Bilirubin 0.5 mg/dL      eGFR   Amer 32 (L) mL/min/1.73      Globulin 5.1 gm/dL      A/G Ratio 0.7 (L) g/dL      BUN/Creatinine Ratio 18.7     Anion Gap 14.0 (H) mmol/L     Narrative:       The MDRD GFR formula is only valid for adults with stable renal function between ages 18 and 70.    CK [[id number]]  (Abnormal) Collected:  03/16/18 0443    Specimen:  Blood Updated:  03/16/18 0541     Creatine Kinase 693 (H) U/L     CBC Auto Differential [[id number]]  (Abnormal) Collected:  03/16/18 0443    Specimen:  Blood Updated:  03/16/18 0529     WBC 7.04 10*3/mm3      RBC 3.27 (L) 10*6/mm3      Hemoglobin 11.0 (L) g/dL      Hematocrit 33.2 (L) %      .5 (H) fL      MCH 33.6 (H) pg      MCHC 33.1 g/dL      RDW 13.8 %      RDW-SD 51.7 fl      MPV 10.4 fL      Platelets 245 10*3/mm3      Neutrophil % 87.5 (H) %      Lymphocyte % 8.7 (L) %      Monocyte % 2.3 (L) %      Eosinophil % 0.0 %      Basophil % 0.1 %      Immature Grans % 1.4 %      Neutrophils, Absolute 6.16 10*3/mm3      Lymphocytes, Absolute 0.61 (L) 10*3/mm3      Monocytes,  Absolute 0.16 (L) 10*3/mm3      Eosinophils, Absolute 0.00 10*3/mm3      Basophils, Absolute 0.01 10*3/mm3      Immature Grans, Absolute 0.10 (H) 10*3/mm3      nRBC 0.0 /100 WBC     Cortisol [[id number]] Collected:  03/16/18 0443    Specimen:  Blood Updated:  03/16/18 0523    POC Glucose Once [[id number]]  (Abnormal) Collected:  03/16/18 0114    Specimen:  Blood Updated:  03/16/18 0125     Glucose 208 (H) mg/dL      Comment: : [id number] [facility] ID: [id number]       Blood Culture - Blood, [[id number]]  (Normal) Collected:  03/15/18 1202    Specimen:  Blood from Arm, Right Updated:  03/16/18 0031     Blood Culture No growth at less than 24 hours    Blood Culture - Blood, [[id number]]  (Normal) Collected:  03/15/18 1202    Specimen:  Blood from Arm, Left Updated:  03/16/18 0031     Blood Culture No growth at less than 24 hours    POC Glucose Once [[id number]]  (Normal) Collected:  03/15/18 2015    Specimen:  Blood Updated:  03/15/18 2036     Glucose 71 mg/dL      Comment: : [id number] Elroy Lemons ID: [id number]       Basic Metabolic Panel [[id number]]  (Abnormal) Collected:  03/15/18 1836    Specimen:  Blood Updated:  03/15/18 1906     Glucose 56 (L) mg/dL      BUN 36 (H) mg/dL      Creatinine 2.07 (H) mg/dL      Sodium 146 (H) mmol/L      Potassium 4.4 mmol/L      Chloride 112 (H) mmol/L      CO2 25.0 mmol/L      Calcium 8.7 mg/dL      eGFR  African Amer 28 (L) mL/min/1.73      BUN/Creatinine Ratio 17.4     Anion Gap 9.0 mmol/L     Narrative:       The MDRD GFR formula is only valid for adults with stable renal function between ages 18 and 70.    T4, Free [[id number]]  (Abnormal) Collected:  03/15/18 1202    Specimen:  Blood Updated:  03/15/18 1746     Free T4 <0.07 (L) ng/dL     T3, Free [[id number]]  (Abnormal) Collected:  03/15/18 1202    Specimen:  Blood Updated:  03/15/18 1746     T3, Free 0.78 (L) pg/mL     Lactic Acid, Reflex [[id number]]  (Abnormal) Collected:  03/15/18 1617    Specimen:   Blood Updated:  03/15/18 1646     Lactate 2.4 (C) mmol/L     Lactic Acid, Reflex Timer (This will reflex a repeat order 3-3:15 hours after ordered.) [[id number]] Collected:  03/15/18 1202    Specimen:  Blood Updated:  03/15/18 1601     Extra Tube Hold for add-ons.     Comment: Auto resulted.       CK [[id number]]  (Abnormal) Collected:  03/15/18 1202    Specimen:  Blood Updated:  03/15/18 1347     Creatine Kinase 1,077 (H) U/L     TSH [[id number]]  (Abnormal) Collected:  03/15/18 1202    Specimen:  Blood Updated:  03/15/18 1307     TSH 52.900 (H) mIU/mL     Comprehensive Metabolic Panel [[id number]]  (Abnormal) Collected:  03/15/18 1202    Specimen:  Blood Updated:  03/15/18 1249     Glucose 109 (H) mg/dL      BUN 36 (H) mg/dL      Comment: Specimen hemolyzed. Results may be affected.        Creatinine 1.93 (H) mg/dL      Sodium 145 mmol/L      Potassium 5.6 (H) mmol/L      Comment: Specimen hemolyzed.  Results may be affected.        Chloride 108 mmol/L      CO2 19.0 (L) mmol/L      Calcium 9.7 mg/dL      Total Protein 10.4 (H) g/dL      Albumin 4.20 g/dL      ALT (SGPT) 34 U/L      Comment: Specimen hemolyzed.  Results may be affected.        AST (SGOT) 74 (H) U/L      Comment: Specimen hemolyzed.  Results may be affected.        Alkaline Phosphatase 96 U/L      Comment: Specimen hemolyzed. Results may be affected.        Total Bilirubin 1.0 mg/dL      eGFR  African Amer 30 (L) mL/min/1.73      Globulin 6.2 gm/dL      A/G Ratio 0.7 (L) g/dL      BUN/Creatinine Ratio 18.7     Anion Gap 18.0 (H) mmol/L     Narrative:       The MDRD GFR formula is only valid for adults with stable renal function between ages 18 and 70.    Lactic Acid, Plasma [[id number]]  (Abnormal) Collected:  03/15/18 1202    Specimen:  Blood Updated:  03/15/18 1249     Lactate 2.9 (C) mmol/L     Troponin [[id number]]  (Normal) Collected:  03/15/18 1202    Specimen:  Blood Updated:  03/15/18 1248     Troponin I 0.016 ng/mL     Lipase [[id number]]   (Normal) Collected:  03/15/18 1202    Specimen:  Blood Updated:  03/15/18 1236     Lipase 148 U/L     Urinalysis With / Culture If Indicated - Urine, Catheter [[id number]]  (Abnormal) Collected:  03/15/18 1218    Specimen:  Urine from Urine, Catheter Updated:  03/15/18 1230     Color, UA Yellow     Appearance, UA Clear     pH, UA <=5.0     Specific Gravity, UA 1.017     Glucose, UA Negative     Ketones, UA Trace (A)     Bilirubin, UA Negative     Blood, UA Moderate (2+) (A)     Protein,  mg/dL (2+) (A)     Leuk Esterase, UA Negative     Nitrite, UA Negative     Urobilinogen, UA 0.2 E.U./dL    Urinalysis, Microscopic Only - Urine, Clean Catch [[id number]]  (Abnormal) Collected:  03/15/18 1218    Specimen:  Urine from Urine, Catheter Updated:  03/15/18 1230     RBC, UA 0-2 (A) /HPF      WBC, UA 0-2 (A) /HPF      Bacteria, UA None Seen /HPF      Squamous Epithelial Cells, UA None Seen /HPF      Hyaline Casts, UA 0-2 /LPF      Methodology Automated Microscopy    Protime-INR [[id number]]  (Abnormal) Collected:  03/15/18 1202    Specimen:  Blood Updated:  03/15/18 1228     Protime 12.3 Seconds      INR 0.89 (L)    CBC & Differential [[id number]] Collected:  03/15/18 1202    Specimen:  Blood Updated:  03/15/18 1219    Narrative:       The following orders were created for panel order CBC & Differential.  Procedure                               Abnormality         Status                     ---------                               -----------         ------                     CBC Auto Differential[[id number]]        Abnormal            Final result                 Please view results for these tests on the individual orders.    CBC Auto Differential [[id number]]  (Abnormal) Collected:  03/15/18 1202    Specimen:  Blood Updated:  03/15/18 1219     WBC 7.46 10*3/mm3      RBC 3.55 (L) 10*6/mm3      Hemoglobin 12.0 g/dL      Hematocrit 36.8 (L) %      .7 (H) fL      MCH 33.8 (H) pg      MCHC 32.6 (L) g/dL      RDW 14.0 %       RDW-SD 53.3 fl      MPV 10.0 fL      Platelets 261 10*3/mm3      Neutrophil % 80.4 (H) %      Lymphocyte % 13.0 (L) %      Monocyte % 4.6 %      Eosinophil % 0.0 %      Basophil % 0.3 %      Immature Grans % 1.7 %      Neutrophils, Absolute 6.00 10*3/mm3      Lymphocytes, Absolute 0.97 10*3/mm3      Monocytes, Absolute 0.34 10*3/mm3      Eosinophils, Absolute 0.00 10*3/mm3      Basophils, Absolute 0.02 10*3/mm3      Immature Grans, Absolute 0.13 (H) 10*3/mm3      nRBC 0.3 (H) /100 WBC     Blood Gas, Arterial [[id number]]  (Abnormal) Collected:  03/15/18 1155    Specimen:  Arterial Blood Updated:  03/15/18 1200     Site Right Radial     Garcia's Test Positive     pH, Arterial 7.392 pH units      pCO2, Arterial 34.3 (L) mm Hg      pO2, Arterial 57.4 (L) mm Hg      HCO3, Arterial 20.9 mmol/L      Base Excess, Arterial -3.5 (L) mmol/L      O2 Saturation, Arterial 88.0 (L) %      Temperature 37.0 C      Barometric Pressure for Blood Gas 750 mmHg      Modality Room Air     Ventilator Mode NA     Collected by [id number]        Xr Spine Thoracic 2 View    Result Date: 3/15/2018  Narrative: EXAMINATION: XR SPINE THORACIC 2 VW-  3/15/2018 8:50 PM CDT  HISTORY: fall; R94.6-Abnormal results of thyroid function studies; R13.12-Dysphagia, oropharyngeal phase; R41.82-Altered mental status, unspecified; T68.XXXA-Hypothermia, initial encounter; M62.82-Rhabdomyolysis; N17.9-Acute kidney failure, unspecified; S06.0M3Y-Tsdgmobhj subdural hemorrhage with loss of consciousness of unspecified duration, initial encounter; R91.8-Other nonspecific abnormal finding of .  Three-view thoracic spine series.  Prominent disc space narrowing and endplate spurring throughout the mid cervical spine.  No significant thoracic scoliosis. Diffuse osteopenia.  Thoracic lordosis is appropriate.  Mild disc space narrowing and endplate spurring.  No acute fracture is seen.  Summary: 1. No evidence of thoracic spine fracture. This report was finalized on  03/15/2018 21:11 by Dr. Saurabh Black MD.    Xr Spine Lumbar 2 Or 3 View    Result Date: 3/15/2018  Narrative: EXAMINATION: XR SPINE LUMBAR 2 OR 3 VW-  3/15/2018 8:53 PM CDT  HISTORY: fall; R94.6-Abnormal results of thyroid function studies; R13.12-Dysphagia, oropharyngeal phase; R41.82-Altered mental status, unspecified; T68.XXXA-Hypothermia, initial encounter; M62.82-Rhabdomyolysis; N17.9-Acute kidney failure, unspecified; S06.9P7R-Encidszlu subdural hemorrhage with loss of consciousness of unspecified duration, initial encounter; R91.8-Other nonspecific abnormal finding of .  Lumbar spine series, 2 views.  Diffuse osteopenia.  Symmetric SI joints.  Lordotic curvature is appropriate. Mild endplate spurring. No compression fracture.  Summary: 1. No acute bony abnormality. This report was finalized on 03/15/2018 21:12 by Dr. Saurabh Black MD.    Ct Head Without Contrast    Result Date: 3/15/2018  Narrative: EXAMINATION: CT HEAD WO CONTRAST-  3/15/2018 1:06 PM CDT  CT SCAN OF THE HEAD, WITHOUT CONTRAST:  HISTORY: Confusion/delirium, altered level of consciousness, unexplained  COMPARISONS: 1/11/2015 head CT  TECHNIQUE:  Radiation dose equals DLP 1115 mGy-cm.  Automated exposure control dose reduction technique was implemented.   CT evaluation of the head without intravenous contrast. 5 mm transaxial images were obtained.   2-D sagittal and coronal reconstruction images were generated.  FINDINGS: Image quality degradation related to streak-like motion artifact appreciated.  There is diffuse central and cortical atrophy.  There is low-attenuation in the periventricular and subcortical white matter compatible with chronic ischemic changes.  There are small bilateral subdural collections, slightly greater attenuation than CSF. Suspect chronic subdural hemorrhagic changes. The subdural collection along the right convexity is slightly greater measuring nearly 5 mm in thickness. The left subdural collection measures  approximately 3.5 mm in thickness along the convexity.  There is no intraaxial hemorrhage. There is no acute extra-axial hemorrhage.  There is no mass effect or midline shift. There is no hydrocephalus.  Bone window imaging reveals no calvarial abnormality.  Paranasal sinuses imaged in part are clear.       Impression: 1. Small bilateral subdural collections, attenuation slightly greater than CSF, suspect chronic subdural hemorrhagic change 2. No acute hemorrhage.. 3. Atrophy and chronic ischemic white matter change.     This report was finalized on 03/15/2018 13:14 by Dr. Trevor Amaya MD.    Xr Chest 1 View    Result Date: 3/15/2018  Narrative: EXAMINATION: XR CHEST 1 VW- 3/15/2018 1:43 PM CDT  HISTORY: AMS.  REPORT: Comparison is made with the study from 1/11/2015.  The lungs are hyperinflated, no infiltrate is identified. There is a small nodular density at the left base which is probably a nipple shadow. There is a small masslike density in the medial right lung base that measures approximately 2 cm. Heart size is normal. No pneumothorax or pleural effusion is identified. The osseous structures and upper abdomen appear unremarkable.      Impression: 1. 2 cm masslike density in the medial right lung base, CT of chest without contrast is recommended for follow-up to exclude neoplasm. A small nodular density at the left lung base may represent a nipple shadow. This can be evaluated on CT as well. COPD.  REPORT was flagged for attention to the emergency department. This report was finalized on 03/15/2018 13:48 by Dr. James Solorzano MD.          Plan:     Altered mental status - check MRI brain today  Chronic SDH - no mass effect, cont observation with serial neuro exams    Elroy Stephens MD

## 2018-03-16 NOTE — PLAN OF CARE
Problem: Patient Care Overview  Goal: Plan of Care Review  Outcome: Ongoing (interventions implemented as appropriate)   03/16/18 0520   Coping/Psychosocial   Plan of Care Reviewed With patient   OTHER   Outcome Summary Pt only oriented to self. Pt moves all extremities but not to command. PERRLA. Pt pulling at lines and monitors and had to be put in restraints. Pt cont to be very restless throughout the night and .5 of ativan was given x2 per MD order.    Plan of Care Review   Progress no change     Goal: Individualization and Mutuality  Outcome: Ongoing (interventions implemented as appropriate)    Goal: Discharge Needs Assessment  Outcome: Ongoing (interventions implemented as appropriate)    Goal: Interprofessional Rounds/Family Conf  Outcome: Ongoing (interventions implemented as appropriate)      Problem: Fall Risk (Adult)  Goal: Identify Related Risk Factors and Signs and Symptoms  Outcome: Ongoing (interventions implemented as appropriate)    Goal: Absence of Fall  Outcome: Ongoing (interventions implemented as appropriate)      Problem: Skin Injury Risk (Adult)  Goal: Identify Related Risk Factors and Signs and Symptoms  Outcome: Ongoing (interventions implemented as appropriate)    Goal: Skin Health and Integrity  Outcome: Ongoing (interventions implemented as appropriate)      Problem: Restraint, Nonbehavioral (Nonviolent)  Goal: Rationale and Justification  Outcome: Ongoing (interventions implemented as appropriate)    Goal: Nonbehavioral (Nonviolent) Restraint: Absence of Injury/Harm  Outcome: Ongoing (interventions implemented as appropriate)    Goal: Nonbehavioral (Nonviolent) Restraint: Achievement of Discontinuation Criteria  Outcome: Ongoing (interventions implemented as appropriate)    Goal: Nonbehavioral (Nonviolent) Restraint: Preservation of Dignity and Wellbeing  Outcome: Ongoing (interventions implemented as appropriate)      Problem: Confusion, Acute (Adult)  Goal: Identify Related Risk  Factors and Signs and Symptoms  Outcome: Ongoing (interventions implemented as appropriate)    Goal: Cognitive/Functional Impairments Minimized  Outcome: Ongoing (interventions implemented as appropriate)    Goal: Safety  Outcome: Ongoing (interventions implemented as appropriate)

## 2018-03-17 ENCOUNTER — APPOINTMENT (OUTPATIENT)
Dept: GENERAL RADIOLOGY | Facility: HOSPITAL | Age: 83
End: 2018-03-17

## 2018-03-17 LAB
ALBUMIN SERPL-MCNC: 2.9 G/DL (ref 3.5–5)
ALBUMIN/GLOB SERPL: 0.7 G/DL (ref 1.1–2.5)
ALP SERPL-CCNC: 58 U/L (ref 24–120)
ALT SERPL W P-5'-P-CCNC: 28 U/L (ref 0–54)
ANION GAP SERPL CALCULATED.3IONS-SCNC: 7 MMOL/L (ref 4–13)
AST SERPL-CCNC: 45 U/L (ref 7–45)
BH CV ECHO MEAS - AI DEC SLOPE: 282 CM/SEC^2
BH CV ECHO MEAS - AI MAX PG: 49.3 MMHG
BH CV ECHO MEAS - AI MAX VEL: 351 CM/SEC
BH CV ECHO MEAS - AI P1/2T: 364.6 MSEC
BH CV ECHO MEAS - AO MAX PG (FULL): 3.8 MMHG
BH CV ECHO MEAS - AO MAX PG: 9 MMHG
BH CV ECHO MEAS - AO MEAN PG (FULL): 2 MMHG
BH CV ECHO MEAS - AO MEAN PG: 5 MMHG
BH CV ECHO MEAS - AO ROOT AREA (BSA CORRECTED): 2.1
BH CV ECHO MEAS - AO ROOT AREA: 5.7 CM^2
BH CV ECHO MEAS - AO ROOT DIAM: 2.7 CM
BH CV ECHO MEAS - AO V2 MAX: 150 CM/SEC
BH CV ECHO MEAS - AO V2 MEAN: 102 CM/SEC
BH CV ECHO MEAS - AO V2 VTI: 36.5 CM
BH CV ECHO MEAS - AVA(I,A): 0.32 CM^2
BH CV ECHO MEAS - AVA(I,D): 0.32 CM^2
BH CV ECHO MEAS - AVA(V,A): 0.38 CM^2
BH CV ECHO MEAS - AVA(V,D): 0.38 CM^2
BH CV ECHO MEAS - BSA(HAYCOCK): 1.2 M^2
BH CV ECHO MEAS - BSA: 1.3 M^2
BH CV ECHO MEAS - BZI_BMI: 13.9 KILOGRAMS/M^2
BH CV ECHO MEAS - BZI_METRIC_HEIGHT: 157.5 CM
BH CV ECHO MEAS - BZI_METRIC_WEIGHT: 34.5 KG
BH CV ECHO MEAS - EDV(CUBED): 76.2 ML
BH CV ECHO MEAS - EDV(TEICH): 80.4 ML
BH CV ECHO MEAS - EF(CUBED): 80.5 %
BH CV ECHO MEAS - EF(TEICH): 73.3 %
BH CV ECHO MEAS - ESV(CUBED): 14.9 ML
BH CV ECHO MEAS - ESV(TEICH): 21.4 ML
BH CV ECHO MEAS - FS: 42 %
BH CV ECHO MEAS - IVS/LVPW: 1.1
BH CV ECHO MEAS - IVSD: 0.84 CM
BH CV ECHO MEAS - LA DIMENSION: 1.9 CM
BH CV ECHO MEAS - LA/AO: 0.7
BH CV ECHO MEAS - LV MASS(C)D: 104.6 GRAMS
BH CV ECHO MEAS - LV MASS(C)DI: 82.6 GRAMS/M^2
BH CV ECHO MEAS - LV MAX PG: 5.2 MMHG
BH CV ECHO MEAS - LV MEAN PG: 3 MMHG
BH CV ECHO MEAS - LV V1 MAX: 114 CM/SEC
BH CV ECHO MEAS - LV V1 MEAN: 71.8 CM/SEC
BH CV ECHO MEAS - LV V1 VTI: 22.9 CM
BH CV ECHO MEAS - LVIDD: 4.2 CM
BH CV ECHO MEAS - LVIDS: 2.5 CM
BH CV ECHO MEAS - LVOT AREA (M): 0.5 CM^2
BH CV ECHO MEAS - LVOT AREA: 0.5 CM^2
BH CV ECHO MEAS - LVOT DIAM: 0.8 CM
BH CV ECHO MEAS - LVPWD: 0.78 CM
BH CV ECHO MEAS - PA MAX PG: 9 MMHG
BH CV ECHO MEAS - PA V2 MAX: 150 CM/SEC
BH CV ECHO MEAS - RAP SYSTOLE: 5 MMHG
BH CV ECHO MEAS - RVSP: 24.5 MMHG
BH CV ECHO MEAS - SI(AO): 164.9 ML/M^2
BH CV ECHO MEAS - SI(CUBED): 48.4 ML/M^2
BH CV ECHO MEAS - SI(LVOT): 9.1 ML/M^2
BH CV ECHO MEAS - SI(TEICH): 46.5 ML/M^2
BH CV ECHO MEAS - SV(AO): 209 ML
BH CV ECHO MEAS - SV(CUBED): 61.3 ML
BH CV ECHO MEAS - SV(LVOT): 11.5 ML
BH CV ECHO MEAS - SV(TEICH): 58.9 ML
BH CV ECHO MEAS - TR MAX VEL: 221 CM/SEC
BILIRUB SERPL-MCNC: 0.3 MG/DL (ref 0.1–1)
BUN BLD-MCNC: 27 MG/DL (ref 5–21)
BUN/CREAT SERPL: 18.9 (ref 7–25)
CALCIUM SPEC-SCNC: 7.8 MG/DL (ref 8.4–10.4)
CHLORIDE SERPL-SCNC: 113 MMOL/L (ref 98–110)
CO2 SERPL-SCNC: 25 MMOL/L (ref 24–31)
CORTIS SERPL-MCNC: 53.2 UG/DL
CREAT BLD-MCNC: 1.43 MG/DL (ref 0.5–1.4)
DEPRECATED RDW RBC AUTO: 51.2 FL (ref 40–54)
ERYTHROCYTE [DISTWIDTH] IN BLOOD BY AUTOMATED COUNT: 14 % (ref 12–15)
GFR SERPL CREATININE-BSD FRML MDRD: 42 ML/MIN/1.73
GLOBULIN UR ELPH-MCNC: 4.4 GM/DL
GLUCOSE BLD-MCNC: 217 MG/DL (ref 70–100)
HCT VFR BLD AUTO: 27 % (ref 37–47)
HGB BLD-MCNC: 9 G/DL (ref 12–16)
LV EF 2D ECHO EST: 55 %
LYMPHOCYTES # BLD MANUAL: 0.21 10*3/MM3 (ref 0.72–4.86)
LYMPHOCYTES NFR BLD MANUAL: 2 % (ref 15–45)
MAXIMAL PREDICTED HEART RATE: 136 BPM
MCH RBC QN AUTO: 33.6 PG (ref 28–32)
MCHC RBC AUTO-ENTMCNC: 33.3 G/DL (ref 33–36)
MCV RBC AUTO: 100.7 FL (ref 82–98)
NEUTROPHILS # BLD AUTO: 10.3 10*3/MM3 (ref 1.87–8.4)
NEUTROPHILS NFR BLD MANUAL: 76 % (ref 39–78)
NEUTS BAND NFR BLD MANUAL: 22 % (ref 0–10)
NRBC BLD MANUAL-RTO: 0.3 /100 WBC (ref 0–0)
PLAT MORPH BLD: NORMAL
PLATELET # BLD AUTO: 203 10*3/MM3 (ref 130–400)
PMV BLD AUTO: 10.2 FL (ref 6–12)
POTASSIUM BLD-SCNC: 3.4 MMOL/L (ref 3.5–5.3)
PROT SERPL-MCNC: 7.3 G/DL (ref 6.3–8.7)
RBC # BLD AUTO: 2.68 10*6/MM3 (ref 4.2–5.4)
RBC MORPH BLD: NORMAL
SODIUM BLD-SCNC: 145 MMOL/L (ref 135–145)
STRESS TARGET HR: 116 BPM
T4 FREE SERPL-MCNC: 2.22 NG/DL (ref 0.78–2.19)
WBC MORPH BLD: NORMAL
WBC NRBC COR # BLD: 10.51 10*3/MM3 (ref 4.8–10.8)

## 2018-03-17 PROCEDURE — 84439 ASSAY OF FREE THYROXINE: CPT | Performed by: FAMILY MEDICINE

## 2018-03-17 PROCEDURE — 71045 X-RAY EXAM CHEST 1 VIEW: CPT

## 2018-03-17 PROCEDURE — 85007 BL SMEAR W/DIFF WBC COUNT: CPT | Performed by: FAMILY MEDICINE

## 2018-03-17 PROCEDURE — 25010000002 METHYLPREDNISOLONE PER 125 MG: Performed by: FAMILY MEDICINE

## 2018-03-17 PROCEDURE — 80053 COMPREHEN METABOLIC PANEL: CPT | Performed by: FAMILY MEDICINE

## 2018-03-17 PROCEDURE — 92526 ORAL FUNCTION THERAPY: CPT | Performed by: SPEECH-LANGUAGE PATHOLOGIST

## 2018-03-17 PROCEDURE — 25010000002 HYDRALAZINE PER 20 MG: Performed by: FAMILY MEDICINE

## 2018-03-17 PROCEDURE — 85025 COMPLETE CBC W/AUTO DIFF WBC: CPT | Performed by: FAMILY MEDICINE

## 2018-03-17 RX ORDER — LORAZEPAM 2 MG/ML
1 INJECTION INTRAMUSCULAR EVERY 12 HOURS PRN
Status: DISCONTINUED | OUTPATIENT
Start: 2018-03-17 | End: 2018-03-20 | Stop reason: HOSPADM

## 2018-03-17 RX ORDER — MORPHINE SULFATE 2 MG/ML
1 INJECTION, SOLUTION INTRAMUSCULAR; INTRAVENOUS CONTINUOUS
Status: DISCONTINUED | OUTPATIENT
Start: 2018-03-18 | End: 2018-03-18

## 2018-03-17 RX ADMIN — METHYLPREDNISOLONE SODIUM SUCCINATE 80 MG: 125 INJECTION, POWDER, FOR SOLUTION INTRAMUSCULAR; INTRAVENOUS at 03:55

## 2018-03-17 RX ADMIN — DEXTROSE AND SODIUM CHLORIDE 100 ML/HR: 5; 450 INJECTION, SOLUTION INTRAVENOUS at 18:07

## 2018-03-17 RX ADMIN — LEVOTHYROXINE SODIUM ANHYDROUS 150 MCG: 100 INJECTION, POWDER, LYOPHILIZED, FOR SOLUTION INTRAVENOUS at 06:55

## 2018-03-17 RX ADMIN — METHYLPREDNISOLONE SODIUM SUCCINATE 80 MG: 125 INJECTION, POWDER, FOR SOLUTION INTRAMUSCULAR; INTRAVENOUS at 18:07

## 2018-03-17 RX ADMIN — Medication 10 MG: at 00:35

## 2018-03-17 RX ADMIN — METHYLPREDNISOLONE SODIUM SUCCINATE 80 MG: 125 INJECTION, POWDER, FOR SOLUTION INTRAMUSCULAR; INTRAVENOUS at 11:07

## 2018-03-17 RX ADMIN — Medication 10 MG: at 15:21

## 2018-03-17 NOTE — PLAN OF CARE
Problem: Patient Care Overview  Goal: Plan of Care Review  Outcome: Ongoing (interventions implemented as appropriate)   03/17/18 0455   Coping/Psychosocial   Plan of Care Reviewed With patient   Plan of Care Review   Progress no change     Goal: Individualization and Mutuality  Outcome: Ongoing (interventions implemented as appropriate)    Goal: Discharge Needs Assessment  Outcome: Ongoing (interventions implemented as appropriate)      Problem: Fall Risk (Adult)  Goal: Identify Related Risk Factors and Signs and Symptoms  Outcome: Ongoing (interventions implemented as appropriate)    Goal: Absence of Fall  Outcome: Ongoing (interventions implemented as appropriate)      Problem: Skin Injury Risk (Adult)  Goal: Identify Related Risk Factors and Signs and Symptoms  Outcome: Ongoing (interventions implemented as appropriate)    Goal: Skin Health and Integrity  Outcome: Ongoing (interventions implemented as appropriate)      Problem: Restraint, Nonbehavioral (Nonviolent)  Goal: Nonbehavioral (Nonviolent) Restraint: Absence of Injury/Harm  Outcome: Ongoing (interventions implemented as appropriate)    Goal: Nonbehavioral (Nonviolent) Restraint: Achievement of Discontinuation Criteria  Outcome: Ongoing (interventions implemented as appropriate)    Goal: Nonbehavioral (Nonviolent) Restraint: Preservation of Dignity and Wellbeing  Outcome: Ongoing (interventions implemented as appropriate)      Problem: Confusion, Acute (Adult)  Goal: Identify Related Risk Factors and Signs and Symptoms  Outcome: Ongoing (interventions implemented as appropriate)    Goal: Cognitive/Functional Impairments Minimized  Outcome: Ongoing (interventions implemented as appropriate)    Goal: Safety  Outcome: Ongoing (interventions implemented as appropriate)      Problem: Nutrition, Imbalanced: Inadequate Oral Intake (Adult)  Goal: Identify Related Risk Factors and Signs and Symptoms  Outcome: Ongoing (interventions implemented as  appropriate)

## 2018-03-17 NOTE — THERAPY TREATMENT NOTE
Acute Care - Speech Language Pathology   Swallow Treatment Note [facility]     Patient Name: Saúl Santiago  : 1933  MRN: [id number]  Today's Date: 3/17/2018               Admit Date: 3/15/2018  ST tx completed. Pt more alert and able to follow oral motor directions this date. Pt attempted to protrude tongue anteriorly and laterally. Pt followed commands with min prompts. Ice chip rub given initially with swallow initiated. Small tsp of water presented with delay in swallow initation and 2x multiple swallow. Pt happy with smile on face when asked if she would like applesauce trials. Pt stated she liked applesauce. Pt noted to have prolonged oral manipulation of bolus with each presentation. Pt O2 noted to drop to low 90s with each puree trial. Multiple swallows noted. Audible crackles in breathing at rest. ST feels pt would benefit from temporary ALEJANDRA until more alert and able to better tolerate PO trials. ST to follow.   Lisbeth Bright, CCC-SLP 3/17/2018 10:49 AM      Visit Dx:      ICD-10-CM ICD-9-CM   1. Elevated TSH R94.6 794.5   2. Oropharyngeal dysphagia R13.12 787.22   3. Altered mental status, unspecified altered mental status type R41.82 780.97   4. Hypothermia, initial encounter T68.XXXA 991.6   5. Non-traumatic rhabdomyolysis M62.82 728.88   6. SHARRI (acute kidney injury) N17.9 584.9   7. Bilateral subdural hematomas S06.5X9A 432.1   8. Lung mass R91.8 786.6   9. Hyperkalemia E87.5 276.7     Patient Active Problem List   Diagnosis   • Vitamin D deficiency   • Losing weight   • Hypoglycemia   • Essential hypertension   • Mixed hyperlipidemia   • Acquired hypothyroidism   • Diabetes mellitus   • Chronic kidney disease, stage III (moderate)   • COPD (chronic obstructive pulmonary disease)   • Chronic constipation   • Arthritis   • Abnormal SPEP   • Macrocytic anemia   • Tobacco abuse   • Vascular dementia without behavioral disturbance   • Elevated TSH       Therapy Treatment    Therapy  Treatment / Health Promotion    Treatment Time/Intention  Discipline: speech language pathologist (AMY Martino)  Document Type: therapy note (daily note) (AMY Martino)  Subjective Information: no complaints (AMY Martino)  Mode of Treatment: speech-language pathology, individual therapy (AMY Martino)  Patient Effort: adequate (AMY Martino)  Plan of Care Review  Plan of Care Reviewed With: other (see comments) (RN, Anshu) (Lisbeth Jeremi Coleman, CCC-SLP)    Vitals/Pain/Safety  Pain Assessment  Additional Documentation: Pain Scale: FACES Pre/Post-Treatment (Group) (AMY Martino)  Pain Scale: FACES Pre/Post-Treatment  Pain: FACES Scale, Pretreatment: 0-->no hurt (AMY Martino)    Cognition, Communication, Swallow  Recommendations  Anticipated Dischage Disposition: skilled nursing facility (AMY Martino)    Outcome Summary  Outcome Summary/Treatment Plan (SLP)  Daily Summary of Progress (SLP): progress toward functional goals is gradual (AMY Martino)  Plan for Continued Treatment (SLP): Continue trials of PO (AMY Martino)  Anticipated Dischage Disposition: skilled nursing facility (AMY Martino)            SLP GOALS     Row Name 03/17/18 1023 03/16/18 0846 03/15/18 1301       Oral Nutrition/Hydration Goal 1 (SLP)    Oral Nutrition/Hydration Goal 1, SLP Pt will tolerate least restrictive diet with SLP with no overt s/s of aspiration  -BN Pt will tolerate least restrictive diet with no overt s/s of aspiration.  -MB Pt will tolerate least restrictive diet with no overt s/s of aspiration.  -MB    Time Frame (Oral Nutrition/Hydration Goal 1, SLP) short term goal (STG);by discharge  -BN short term goal (STG);by discharge  -MB short term goal (STG);by discharge  -MB    Barriers (Oral Nutrition/Hydration Goal 1, SLP) cognitive  status  -BN cognitive status  -MB n/a  -MB    Progress/Outcomes (Oral Nutrition/Hydration Goal 1, SLP) goal ongoing;continuing progress toward goal  -BN unable to make needed progress;goal ongoing  -MB goal ongoing  -MB       Lingual Strengthening Goal 1 (SLP)    Activity (Lingual Strengthening Goal 1, SLP)  -- increase lingual tone/sensation/control/coordination/movement;increase tongue back strength  -MB increase lingual tone/sensation/control/coordination/movement;increase tongue back strength  -MB    Increase Lingual Tone/Sensation/Control/Coordination/Movement  -- lingual resistance exercises  -MB lingual resistance exercises  -MB    Increase Tongue Back Strength  -- lingual movement exercises  -MB lingual movement exercises  -MB    [place]/Accuracy (Lingual Strengthening Goal 1, SLP)  -- independently (over 90% accuracy)  -MB independently (over 90% accuracy)  -MB    Time Frame (Lingual Strengthening Goal 1, SLP)  -- short term goal (STG);by discharge  -MB short term goal (STG);by discharge  -MB    Barriers (Lingual Strengthening Goal 1, SLP)  -- n/a  -MB n/a  -MB    Progress/Outcomes (Lingual Strengthening Goal 1, SLP)  -- goal ongoing  -MB goal ongoing  -MB       Pharyngeal Strengthening Exercise Goal 1 (SLP)    Activity (Pharyngeal Strengthening Goal 1, SLP)  -- increase timing;increase squeeze/positive pressure generation;increase tongue base retraction  -MB increase timing;increase squeeze/positive pressure generation;increase tongue base retraction  -MB    Increase Timing  -- gustatory stimulation (sour/cold)  -MB gustatory stimulation (sour/cold)  -MB    Increase Squeeze/Positive Pressure Generation  -- hard effortful swallow  -MB hard effortful swallow  -MB    Increase Tongue Base Retraction  -- keira  -MB keira  -MB    [place]/Accuracy (Pharyngeal Strengthening Goal 1, SLP)  -- independently (over 90% accuracy)  -MB independently (over 90% accuracy)  -MB    Time Frame (Pharyngeal  Strengthening Goal 1, SLP)  -- short term goal (STG);by discharge  -MB short term goal (STG);by discharge  -MB    Barriers (Pharyngeal Strengthening Goal 1, SLP)  -- n/a  -MB n/a  -MB    Progress/Outcomes (Pharyngeal Strengthening Goal 1, SLP)  -- unable to make needed progress;goal ongoing  -MB goal ongoing  -MB      User Key  (r) = Recorded By, (t) = Taken By, (c) = Cosigned By    Initials Name Provider Type    SANTY Levy, CCC-SLP Speech and Language Pathologist    KARMA Bright, CCC-SLP Speech and Language Pathologist          EDUCATION  The patient has been educated in the following areas:   Dysphagia (Swallowing Impairment).    SLP Recommendation and Plan                       Anticipated Dischage Disposition: skilled nursing facility                Plan of Care Reviewed With: other (see comments) (RN, Anshu)  Plan of Care Review  Plan of Care Reviewed With: other (see comments) (RN, Anshu)  Daily Summary of Progress (SLP): progress toward functional goals is gradual  Plan for Continued Treatment (SLP): Continue trials of PO  Progress: improving  Outcome Summary: ST tx completed. Pt more alert and able to follow oral motor directions this date. Pt attempted to protrude tongue anteriorly and laterally. Pt followed commands with min prompts. Ice chip rub given initially with swallow initiated. Small tsp of water presented with delay in swallow initation and 2x multiple swallow. Pt happy with smile on face when asked if she would like applesauce trials. Pt stated she liked applesauce. Pt noted to have prolonged oral manipulation of bolus with each presentation. Pt O2 noted to drop to low 90s with each puree trial. Multiple swallows noted. Audible crackles in breathing at rest. ST feels pt would benefit from temporary ALEJANDRA until more alert and able to better tolerate PO trials. ST to follow.        SLP Outcome Measures (last 72 hours)      SLP Outcome Measures     Row Name 03/15/18 1400              SLP Outcome Measures    Outcome Measure Used? Adult NOMS  -MB         FCM Scores    FCM Chosen Swallowing  -MB      Swallowing FCM Score 4  -MB        User Key  (r) = Recorded By, (t) = Taken By, (c) = Cosigned By    Initials Name Effective Dates    MB AMY Douglas 08/02/16 -              Time Calculation:         Time Calculation- SLP     Row Name 03/17/18 1048             Time Calculation- SLP    SLP Start Time 1023  -BN      SLP Stop Time 1048  -BN      SLP Time Calculation (min) 25 min  -      SLP Received On 03/17/18  -        User Key  (r) = Recorded By, (t) = Taken By, (c) = Cosigned By    Initials Name Provider Type     Lisbeth Bright CCC-SLP Speech and Language Pathologist          Therapy Charges for Today     Code Description Service Date Service Provider Modifiers Qty    [id number]  ST TREATMENT SWALLOW 2 3/17/2018 AMY Martino GN 1          SLP G-Codes  SLP NOMS Used?: Yes  Functional Limitations: Swallowing  Swallow Current Status (): At least 40 percent but less than 60 percent impaired, limited or restricted  Swallow Goal Status (): At least 40 percent but less than 60 percent impaired, limited or restricted      AMY Martino  3/17/2018

## 2018-03-17 NOTE — PROGRESS NOTES
[facility] Medicine Services  INPATIENT PROGRESS NOTE    Length of Stay: 2  Date of Admission: 3/15/2018  Primary Care Physician: Jorgito Muñoz DO    Subjective   Chief Complaint: Unable  Just grunts.  HPI   Unable to obtain due to patient mentation  Per nursing had episode of hypoxia that required 100% NRM  Took 15 minutes to get her oxygen level up.     Review of Systems   Unable.  Grunts.       Objective    Temp:  [96.1 °F (35.6 °C)-97.8 °F (36.6 °C)] 96.1 °F (35.6 °C)  Heart Rate:  [45-87] 62  Resp:  [11-20] 13  BP: ()/(32-89) 159/45  Physical Exam   Constitutional: She appears well-developed and well-nourished.   HENT:   Head: Normocephalic and atraumatic.   Eyes: Conjunctivae and EOM are normal. Pupils are equal, round, and reactive to light.   Neck: Neck supple.   Cardiovascular: Normal rate and regular rhythm.  Exam reveals no gallop and no friction rub.    No murmur heard.  Pulmonary/Chest: Effort normal.   Coarse. Lung sounds.    Abdominal: Soft. Bowel sounds are normal. There is no hepatosplenomegaly. There is no tenderness.   Musculoskeletal: She exhibits no edema.   Neurological: No cranial nerve deficit.   Awake    Skin: Skin is warm and dry.   Psychiatric:   Affect flat.   Nursing note and vitals reviewed.          Results Review:  I have reviewed the labs, radiology results, and diagnostic studies.    Laboratory Data:     Results from last 7 days  Lab Units 03/17/18  0633 03/16/18  0443 03/15/18  1202   WBC 10*3/mm3 10.51 7.04 7.46   HEMOGLOBIN g/dL 9.0* 11.0* 12.0   HEMATOCRIT % 27.0* 33.2* 36.8*   PLATELETS 10*3/mm3 203 245 261          Results from last 7 days  Lab Units 03/17/18  0633 03/16/18  0443 03/15/18  1836 03/15/18  1202   SODIUM mmol/L 145 150* 146* 145   POTASSIUM mmol/L 3.4* 4.1 4.4 5.6*   CHLORIDE mmol/L 113* 114* 112* 108   CO2 mmol/L 25.0 22.0* 25.0 19.0*   BUN mg/dL 27* 34* 36* 36*   CREATININE mg/dL 1.43* 1.82* 2.07* 1.93*    CALCIUM mg/dL 7.8* 8.5 8.7 9.7   BILIRUBIN mg/dL 0.3 0.5  --  1.0   ALK PHOS U/L 58 69  --  96   ALT (SGPT) U/L 28 31  --  34   AST (SGOT) U/L 45 57*  --  74*   GLUCOSE mg/dL 217* 219* 56* 109*       Culture Data:   Blood Culture   Date Value Ref Range Status   03/15/2018 No growth at 24 hours  Preliminary   03/15/2018 No growth at 24 hours  Preliminary       Radiology Data:   Imaging Results (last 24 hours)     Procedure Component Value Units Date/Time    MRI Brain Without Contrast [[id number]] Collected:  03/16/18 1017     Updated:  03/16/18 1025    Narrative:       EXAMINATION: MRI BRAIN WO CONTRAST- 3/16/2018 10:17 AM CDT     HISTORY: Stroke; R94.6-Abnormal results of thyroid function studies;  R13.12-Dysphagia, oropharyngeal phase; R41.82-Altered mental status,  unspecified; T68.XXXA-Hypothermia, initial encounter;  M62.82-Rhabdomyolysis; N17.9-Acute kidney failure, unspecified;  S06.1F1T-Ssykolzhj subdural hemorrhage with loss of consciousness of  unspecified duration, initial encounter;     REPORT: Multiplanar multisequence MR imaging of the brain was performed  without contrast, comparison is made with CT of the head without  contrast 3/15/2018.     Moderate motion artifact is present. There is no diffusion restriction.  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 Gradient echo images show no evidence of acute  intracranial hemorrhage. The ventricles and basal cisterns are within  normal limits. There is no midline shift. Intracranial vascular flow  voids are unremarkable. FLAIR images show moderate increased signal  within the periventricular right matter tracts, greatest in the frontal  lobes. This is compatible advanced chronic small vessel white matter  ischemic disease. Volume loss in the right frontal lobe more focally is  also noted which is compatible with chronic infarct  with  encephalomalacia.       Impression:       1. Motion artifact degrades the study, no evidence of acute ischemia is  identified. There is advanced atrophy, chronic appearing bifrontal and  biparietal subdural hygromas are ill-defined. There is no midline shift.  There are advanced chronic small vessel white matter ischemic changes  particularly in the frontal white matter tracts. A chronic infarct is  noted in the right frontal lobe.  This report was finalized on 03/16/2018 10:22 by Dr. James Solorzano MD.    CT Cervical Spine Without Contrast [[id number]] Collected:  03/16/18 0943     Updated:  03/16/18 0947    Narrative:       CT CERVICAL SPINE WO CONTRAST- 3/16/2018 9:23 AM CDT     HISTORY: fall; R94.6-Abnormal results of thyroid function studies;  R13.12-Dysphagia, oropharyngeal phase; R41.82-Altered mental status,  unspecified; T68.XXXA-Hypothermia, initial encounter;  M62.82-Rhabdomyolysis; N17.9-Acute kidney failure, unspecified;  S06.3S0I-Iroymuniy subdural hemorrhage with loss of consciousness of  unspecified duration, initial encounter; R91.8-Other nonspecific  abnormal finding of      COMPARISON: None      DOSE LENGTH PRODUCT: 528 mGy cm. Automated exposure control was also  utilized to decrease patient radiation dose.     TECHNIQUE: Serial helical tomographic images of the cervical spine were  obtained without the use of intravenous contrast. Additionally, sagittal  and coronal reformatted images were also provided for review.      FINDINGS:   Images are moderately limited by patient motion. Multiple scans were  attempted.     Multilevel degenerative changes are seen in the cervical spine. There is  no evidence of acute fracture or subluxation. The prevertebral soft  tissues are within normal limits. The posterior elements are intact.  Vertebral body heights are maintained.     The visualized skull base is intact. The visualized thorax demonstrates  no acute abnormality.       Impression:       1.  Degenerative changes are noted throughout the spine.     Within limits of this study that is limited by motion, there are no  acute fractures or subluxations. Small fractures could be overlooked due  to motion artifact.  This report was finalized on 03/16/2018 09:44 by Dr. Montrell Andrews MD.          I have reviewed the patient current medications.     Assessment/Plan     Hospital Problem List     Elevated TSH          Metabolic encephalopathy    Markedly elevated TSH in a patient with known hypothyroidism. Does not take medication  Hypertension.  Patient is on lisinopril, per son does not take medication  Abnormal CT head, bilateral subdural v hygromas  Fall at home, patient found in floor.     Abnormal chest xray will need outpatient follow up with non contrasted CT of chest.   Hypothermia, resolved  Hypoxemia    Plan    Chest xray   Wean oxygen if able back to nasal canula  Check T4 in AM  Continue IV fluid      Discharge Planning: I expect the patient to be discharged to SNF in 5-7 days.    Cassie Matthews,    03/17/18   8:46 AM

## 2018-03-17 NOTE — PLAN OF CARE
Problem: Patient Care Overview  Goal: Plan of Care Review  Outcome: Ongoing (interventions implemented as appropriate)   03/17/18 1044   Coping/Psychosocial   Plan of Care Reviewed With other (see comments)  (RN, Anshu)   OTHER   Outcome Summary ST tx completed. Pt more alert and able to follow oral motor directions this date. Pt attempted to protrude tongue anteriorly and laterally. Pt followed commands with min prompts. Ice chip rub given initially with swallow initiated. Small tsp of water presented with delay in swallow initation and 2x multiple swallow. Pt happy with smile on face when asked if she would like applesauce trials. Pt stated she liked applesauce. Pt noted to have prolonged oral manipulation of bolus with each presentation. Pt O2 noted to drop to low 90s with each puree trial. Multiple swallows noted. Audible crackles in breathing at rest. ST feels pt would benefit from temporary ALEJANDRA until more alert and able to better tolerate PO trials. ST to follow.    Plan of Care Review   Progress improving

## 2018-03-17 NOTE — PLAN OF CARE
Problem: Patient Care Overview  Goal: Plan of Care Review   03/17/18 1044   Coping/Psychosocial   Plan of Care Reviewed With other (see comments)  (RN, Ansuh)   OTHER   Outcome Summary ST tx completed. Pt more alert and able to follow oral motor directions this date. Pt attempted to protrude tongue anteriorly and laterally. Pt followed commands with min prompts. Ice chip rub given initially with swallow initiated. Small tsp of water presented with delay in swallow initation and 2x multiple swallow. Pt happy with smile on face when asked if she would like applesauce trials. Pt stated she liked applesauce. Pt noted to have prolonged oral manipulation of bolus with each presentation. Pt O2 noted to drop to low 90s with each puree trial. Multiple swallows noted. Audible crackles in breathing at rest. ST feels pt would benefit from temporary ALEJANDRA until more alert and able to better tolerate PO trials. ST to follow.    Plan of Care Review   Progress improving     Goal: Individualization and Mutuality  Outcome: Ongoing (interventions implemented as appropriate)      Problem: Restraint, Nonbehavioral (Nonviolent)  Goal: Nonbehavioral (Nonviolent) Restraint: Achievement of Discontinuation Criteria  Outcome: Ongoing (interventions implemented as appropriate)      Problem: Confusion, Acute (Adult)  Goal: Identify Related Risk Factors and Signs and Symptoms  Outcome: Ongoing (interventions implemented as appropriate)    Goal: Safety  Outcome: Ongoing (interventions implemented as appropriate)

## 2018-03-18 LAB — BACTERIA BLD CULT: NORMAL

## 2018-03-18 PROCEDURE — 25010000002 METHYLPREDNISOLONE PER 40 MG: Performed by: FAMILY MEDICINE

## 2018-03-18 PROCEDURE — 25010000002 METHYLPREDNISOLONE PER 125 MG: Performed by: FAMILY MEDICINE

## 2018-03-18 RX ORDER — METHYLPREDNISOLONE SODIUM SUCCINATE 40 MG/ML
40 INJECTION, POWDER, LYOPHILIZED, FOR SOLUTION INTRAMUSCULAR; INTRAVENOUS EVERY 8 HOURS
Status: DISCONTINUED | OUTPATIENT
Start: 2018-03-18 | End: 2018-03-19

## 2018-03-18 RX ORDER — MORPHINE SULFATE 1 MG/ML
1-20 INJECTION, SOLUTION INTRAVENOUS CONTINUOUS
Status: DISCONTINUED | OUTPATIENT
Start: 2018-03-18 | End: 2018-03-18

## 2018-03-18 RX ADMIN — DEXTROSE AND SODIUM CHLORIDE 100 ML/HR: 5; 450 INJECTION, SOLUTION INTRAVENOUS at 04:10

## 2018-03-18 RX ADMIN — METHYLPREDNISOLONE SODIUM SUCCINATE 80 MG: 125 INJECTION, POWDER, FOR SOLUTION INTRAMUSCULAR; INTRAVENOUS at 02:57

## 2018-03-18 RX ADMIN — METHYLPREDNISOLONE SODIUM SUCCINATE 40 MG: 40 INJECTION, POWDER, FOR SOLUTION INTRAMUSCULAR; INTRAVENOUS at 19:51

## 2018-03-18 NOTE — PLAN OF CARE
Problem: Patient Care Overview  Goal: Plan of Care Review  Outcome: Ongoing (interventions implemented as appropriate)   03/18/18 0421   Coping/Psychosocial   Plan of Care Reviewed With patient;son   OTHER   Outcome Summary Family (Son Zack) contacted regarding pts deteriorating condition. b/p in the 70-80s, HR in the 40s, and became hard to arouse. Family requested confrence regarding care and the decision was made to make pateint comfort care. inconteint x2    Plan of Care Review   Progress declining     Goal: Individualization and Mutuality  Outcome: Ongoing (interventions implemented as appropriate)    Goal: Discharge Needs Assessment  Outcome: Ongoing (interventions implemented as appropriate)      Problem: Fall Risk (Adult)  Goal: Identify Related Risk Factors and Signs and Symptoms  Outcome: Ongoing (interventions implemented as appropriate)    Goal: Absence of Fall  Outcome: Ongoing (interventions implemented as appropriate)      Problem: Skin Injury Risk (Adult)  Goal: Identify Related Risk Factors and Signs and Symptoms  Outcome: Ongoing (interventions implemented as appropriate)    Goal: Skin Health and Integrity  Outcome: Ongoing (interventions implemented as appropriate)      Problem: Restraint, Nonbehavioral (Nonviolent)  Goal: Rationale and Justification  Outcome: Outcome(s) achieved Date Met: 03/18/18    Goal: Nonbehavioral (Nonviolent) Restraint: Absence of Injury/Harm  Outcome: Outcome(s) achieved Date Met: 03/18/18    Goal: Nonbehavioral (Nonviolent) Restraint: Achievement of Discontinuation Criteria  Outcome: Outcome(s) achieved Date Met: 03/18/18    Goal: Nonbehavioral (Nonviolent) Restraint: Preservation of Dignity and Wellbeing  Outcome: Outcome(s) achieved Date Met: 03/18/18      Problem: Confusion, Acute (Adult)  Goal: Identify Related Risk Factors and Signs and Symptoms  Outcome: Ongoing (interventions implemented as appropriate)    Goal: Cognitive/Functional Impairments  Minimized  Outcome: Ongoing (interventions implemented as appropriate)    Goal: Safety  Outcome: Ongoing (interventions implemented as appropriate)

## 2018-03-18 NOTE — PROGRESS NOTES
Continued Stay Note  [facility]     Patient Name: Saúl Santiago  MRN: [id number]  Today's Date: 3/18/2018    Admit Date: 3/15/2018          Discharge Plan     Row Name 03/18/18 1402       Plan    Plan Pt's family prefers placement at SNF in [place].  ANTOINE has faxed referrals to [facility], [facility] and [facility].  SW will follow for bed offers. JOE [facility]ler.    Patient/Family in Agreement with Plan yes              Discharge Codes    No documentation.           JOE Silverio

## 2018-03-18 NOTE — THERAPY DISCHARGE NOTE
Acute Care - Speech Language Pathology Discharge Summary  [facility]       Patient Name: Saúl Santiago  : 1933  MRN: [id number]    Today's Date: 3/18/2018                   Admit Date: 3/15/2018      SLP Recommendation and Plan    Visit Dx:    ICD-10-CM ICD-9-CM   1. Elevated TSH R94.6 794.5   2. Oropharyngeal dysphagia R13.12 787.22   3. Altered mental status, unspecified altered mental status type R41.82 780.97   4. Hypothermia, initial encounter T68.XXXA 991.6   5. Non-traumatic rhabdomyolysis M62.82 728.88   6. SHARRI (acute kidney injury) N17.9 584.9   7. Bilateral subdural hematomas S06.5X9A 432.1   8. Lung mass R91.8 786.6   9. Hyperkalemia E87.5 276.7                     SLP GOALS     Row Name 18 0933 18 1023 18 0846       Oral Nutrition/Hydration Goal 1 (SLP)    Oral Nutrition/Hydration Goal 1, SLP Pt will tolerate least restrictive diet with no overt s/s of aspiration.  -KW Pt will tolerate least restrictive diet with SLP with no overt s/s of aspiration  -BN Pt will tolerate least restrictive diet with no overt s/s of aspiration.  -MB    Time Frame (Oral Nutrition/Hydration Goal 1, SLP) short term goal (STG);by discharge  -KW short term goal (STG);by discharge  -BN short term goal (STG);by discharge  -MB    Barriers (Oral Nutrition/Hydration Goal 1, SLP) Decline in status  -KW cognitive status  -BN cognitive status  -MB    Progress/Outcomes (Oral Nutrition/Hydration Goal 1, SLP) goal no longer appropriate;medical status inhibited participation  -KW goal ongoing;continuing progress toward goal  -BN unable to make needed progress;goal ongoing  -MB       Lingual Strengthening Goal 1 (SLP)    Activity (Lingual Strengthening Goal 1, SLP) increase lingual tone/sensation/control/coordination/movement;increase tongue back strength  -KW  -- increase lingual tone/sensation/control/coordination/movement;increase tongue back strength  -MB    Increase Lingual  Tone/Sensation/Control/Coordination/Movement lingual resistance exercises  -KW  -- lingual resistance exercises  -MB    Increase Tongue Back Strength lingual movement exercises  -KW  -- lingual movement exercises  -MB    [place]/Accuracy (Lingual Strengthening Goal 1, SLP) independently (over 90% accuracy)  -KW  -- independently (over 90% accuracy)  -MB    Time Frame (Lingual Strengthening Goal 1, SLP) short term goal (STG);by discharge  -KW  -- short term goal (STG);by discharge  -MB    Barriers (Lingual Strengthening Goal 1, SLP)  Decline in status  -KW  -- n/a  -MB    Progress/Outcomes (Lingual Strengthening Goal 1, SLP) goal no longer appropriate;medical status inhibited participation  -KW  -- goal ongoing  -MB       Pharyngeal Strengthening Exercise Goal 1 (SLP)    Activity (Pharyngeal Strengthening Goal 1, SLP) increase timing;increase squeeze/positive pressure generation;increase tongue base retraction  -KW  -- increase timing;increase squeeze/positive pressure generation;increase tongue base retraction  -MB    Increase Timing gustatory stimulation (sour/cold)  -KW  -- gustatory stimulation (sour/cold)  -MB    Increase Squeeze/Positive Pressure Generation hard effortful swallow  -KW  -- hard effortful swallow  -MB    Increase Tongue Base Retraction keira  -KW  -- keira  -MB    [place]/Accuracy (Pharyngeal Strengthening Goal 1, SLP) independently (over 90% accuracy)  -KW  -- independently (over 90% accuracy)  -MB    Time Frame (Pharyngeal Strengthening Goal 1, SLP) short term goal (STG);by discharge  -KW  -- short term goal (STG);by discharge  -MB    Barriers (Pharyngeal Strengthening Goal 1, SLP)  Decline in status  -KW  -- n/a  -MB    Progress/Outcomes (Pharyngeal Strengthening Goal 1, SLP) goal no longer appropriate;medical status inhibited participation  -KW  -- unable to make needed progress;goal ongoing  -MB    Row Name 03/15/18 1301             Oral Nutrition/Hydration Goal 1 (SLP)    Oral  Nutrition/Hydration Goal 1, SLP Pt will tolerate least restrictive diet with no overt s/s of aspiration.  -MB      Time Frame (Oral Nutrition/Hydration Goal 1, SLP) short term goal (STG);by discharge  -MB      Barriers (Oral Nutrition/Hydration Goal 1, SLP) n/a  -MB      Progress/Outcomes (Oral Nutrition/Hydration Goal 1, SLP) goal ongoing  -MB         Lingual Strengthening Goal 1 (SLP)    Activity (Lingual Strengthening Goal 1, SLP) increase lingual tone/sensation/control/coordination/movement;increase tongue back strength  -MB      Increase Lingual Tone/Sensation/Control/Coordination/Movement lingual resistance exercises  -MB      Increase Tongue Back Strength lingual movement exercises  -MB      [place]/Accuracy (Lingual Strengthening Goal 1, SLP) independently (over 90% accuracy)  -MB      Time Frame (Lingual Strengthening Goal 1, SLP) short term goal (STG);by discharge  -MB      Barriers (Lingual Strengthening Goal 1, SLP) n/a  -MB      Progress/Outcomes (Lingual Strengthening Goal 1, SLP) goal ongoing  -MB         Pharyngeal Strengthening Exercise Goal 1 (SLP)    Activity (Pharyngeal Strengthening Goal 1, SLP) increase timing;increase squeeze/positive pressure generation;increase tongue base retraction  -MB      Increase Timing gustatory stimulation (sour/cold)  -MB      Increase Squeeze/Positive Pressure Generation hard effortful swallow  -MB      Increase Tongue Base Retraction keira  -MB      [place]/Accuracy (Pharyngeal Strengthening Goal 1, SLP) independently (over 90% accuracy)  -MB      Time Frame (Pharyngeal Strengthening Goal 1, SLP) short term goal (STG);by discharge  -MB      Barriers (Pharyngeal Strengthening Goal 1, SLP) n/a  -MB      Progress/Outcomes (Pharyngeal Strengthening Goal 1, SLP) goal ongoing  -MB        User Key  (r) = Recorded By, (t) = Taken By, (c) = Cosigned By    Initials Name Provider Type    SANTY Levy, CCC-SLP Speech and Language Pathologist    POLINA DICK  MS Lainey CCC-SLP Speech and Language Pathologist    KARMA Bright, CCC-SLP Speech and Language Pathologist                  SLP Discharge Summary  Anticipated Dischage Disposition: skilled nursing facility  Reason for Discharge: no further expectation of functional progress  Progress Toward Achieving Short/long Term Goals: unable to tolerate or actively participate in therapy, unable to make functional progress at this time  Discharge Destination: other (see comments) (comfort care)      Mayuri Retana MS CCC-SLP  3/18/2018

## 2018-03-18 NOTE — PROGRESS NOTES
Continued Stay Note  [facility]     Patient Name: Saúl Santiago  MRN: [id number]  Today's Date: 3/18/2018    Admit Date: 3/15/2018          Discharge Plan     Row Name 03/18/18 1425       Plan    Plan [facility] offered bed on pt.  JOE Sadler.    Patient/Family in Agreement with Plan yes    Row Name 03/18/18 1409       Plan    Plan Pt's family prefers placement at SNF in [place].  [facility] has faxed referrals to [facility], [facility], [facility] and [facility].  SW will follow for bed offers. JOE Sadler.    Patient/Family in Agreement with Plan yes              Discharge Codes    No documentation.           JOE Silverio

## 2018-03-18 NOTE — PLAN OF CARE
Problem: Patient Care Overview  Goal: Plan of Care Review  Pt resting in bed with eyes closed. Pt son at  this am and states his mother has continued to decline and they wished to withdrawal treatment and AND. Pt is DNR comfort measures. IVF and solumedrol continued, but NPO. Turning patient every 2 hours. Skin tear to lt upper buttocks/hip. Mepilex dressing dry and intact, no drainage. Pt awakens to stimulus, but falls asleep during conversation. Pt is confused, speech incomprehensible at times.  consulted for placement.   03/18/18 1423   Coping/Psychosocial   Plan of Care Reviewed With patient   Plan of Care Review   Progress no change        Problem: Dying Patient, Actively (Adult)  Goal: Identify Related Risk Factors and Signs and Symptoms  Outcome: Ongoing (interventions implemented as appropriate)   03/18/18 1423   Dying Patient, Actively (Adult)   Related Risk Factors (Actively Dying Patient) impaired swallowing;level of consciousness;separation from support   Signs and Symptoms (Actively Dying Patient) decreased attention span;awareness of life transition;awareness reduced;food/fluid disinterest/withdrawal;swallowing difficulty;profound weakness     Goal: Comfort/Pain Control  Outcome: Ongoing (interventions implemented as appropriate)   03/18/18 1423   Dying Patient, Actively (Adult)   Comfort/Pain Control making progress toward outcome     Goal: Peace/Preservation of Dignity During the Dying Process  Outcome: Ongoing (interventions implemented as appropriate)

## 2018-03-18 NOTE — PLAN OF CARE
Problem: Patient Care Overview  Goal: Plan of Care Review  Outcome: Ongoing (interventions implemented as appropriate)   03/18/18 3518   Coping/Psychosocial   Plan of Care Reviewed With caregiver   OTHER   Outcome Summary Patient has had contiued decline and now on comfort measures. RN agrees with discharge. Reconsult if any change.   Plan of Care Review   Progress declining

## 2018-03-18 NOTE — DISCHARGE PLACEMENT REQUEST
"From:  Monse Desaixon, [facility]  [phone]    Saúl Santiago (84 y.o. Female)     Date of Birth Social Security Number Address Home Phone MRN    07/20/1933  [id number] [place] [id number] [phone] [id number]    Taoist Marital Status          [facility] Single       Admission Date Admission Type Admitting Provider Attending Provider Department, Room/Bed    3/15/18 Emergency Cassie Matthews DO Horn, Frances Marie, DO [facility] 3C, 372/1    Discharge Date Discharge Disposition Discharge Destination                       Attending Provider:  Cassie Matthews DO    Allergies:  No Known Allergies    Isolation:  None   Infection:  None   Code Status:  Comfort Cat    Ht:  157.5 cm (62\")   Wt:  34.6 kg (76 lb 3.2 oz)    Admission Cmt:  None   Principal Problem:  None                Active Insurance as of 3/15/2018     Primary Coverage     Payor Plan Insurance Group Employer/Plan Group    MEDICARE MEDICARE A & B      Payor Plan Address Payor Plan Phone Number Effective From Effective To    [address] [phone] 4/1/1992     [place] [address]       Subscriber Name Subscriber Birth Date Member ID       SAÚL SANTIAGO 7/20/1933 [id number]           Secondary Coverage     Payor Plan Insurance Group Employer/Plan Group    [facility] [place] [facility] [facility] [place]      Payor Plan Address Payor Plan Phone Number Effective From Effective To    [address] [address]  1/1/2014     [place] [address]       Subscriber Name Subscriber Birth Date Member ID       SAÚL SANTIAGO 7/20/1933 [id number]                 Emergency Contacts      (Rel.) Home Phone Work Phone Mobile Phone    Zack Santiago (Son) [phone] -- --               History & Physical      Cassie Matthews DO at 3/15/2018  3:30 PM              [facility] Medicine Services  INPATIENT PROGRESS NOTE    Length of Stay: 0  Date of Admission: 3/15/2018  Primary Care " Physician: Jorgito Muñoz DO    Subjective   Chief Complaint: Unable to obtain any meaningful information from patient    HPI   Patient found down at home per nursing.  Son had been here and states he had talked to her on phone last night.  She was reported to be okay      Review of Systems     Unable to do ROS due to mental status.  .     Objective    Temp:  [91.5 °F (33.1 °C)-94.6 °F (34.8 °C)] 94.6 °F (34.8 °C)  Heart Rate:  [57-83] 78  Resp:  [18-20] 20  BP: ()/(39-91) 111/39  Physical Exam   Constitutional: She appears well-developed.   Cachetic     HENT:   Head: Normocephalic and atraumatic.   Right Ear: External ear normal.   Left Ear: External ear normal.   Mouth/Throat: Oropharynx is clear and moist.   Eyes: Pupils are equal, round, and reactive to light.   Neck: No JVD present. No thyromegaly present.   Cardiovascular: Normal rate, regular rhythm and normal heart sounds.  Exam reveals no gallop and no friction rub.    No murmur heard.  Pulmonary/Chest: Effort normal and breath sounds normal. She has no wheezes.   Abdominal: Soft. Bowel sounds are normal.   Flat    Musculoskeletal: She exhibits no edema.   Neurological: No cranial nerve deficit.   Awake.   Confused.    Skin: Skin is dry.   cool   Psychiatric:   Confused.          Results Review:  I have reviewed the labs, radiology results, and diagnostic studies.    Laboratory Data:     Results from last 7 days  Lab Units 03/15/18  1202   WBC 10*3/mm3 7.46   HEMOGLOBIN g/dL 12.0   HEMATOCRIT % 36.8*   PLATELETS 10*3/mm3 261          Results from last 7 days  Lab Units 03/15/18  1202   SODIUM mmol/L 145   POTASSIUM mmol/L 5.6*   CHLORIDE mmol/L 108   CO2 mmol/L 19.0*   BUN mg/dL 36*   CREATININE mg/dL 1.93*   CALCIUM mg/dL 9.7   BILIRUBIN mg/dL 1.0   ALK PHOS U/L 96   ALT (SGPT) U/L 34   AST (SGOT) U/L 74*   GLUCOSE mg/dL 109*       Culture Data:        Radiology Data:   Imaging Results (last 24 hours)     Procedure Component Value Units  Date/Time    XR Chest 1 View [[id number]] Collected:  03/15/18 1343     Updated:  03/15/18 1351    Narrative:       EXAMINATION: XR CHEST 1 VW- 3/15/2018 1:43 PM CDT     HISTORY: AMS.     REPORT: Comparison is made with the study from 1/11/2015.     The lungs are hyperinflated, no infiltrate is identified. There is a  small nodular density at the left base which is probably a nipple  shadow. There is a small masslike density in the medial right lung base  that measures approximately 2 cm. Heart size is normal. No pneumothorax  or pleural effusion is identified. The osseous structures and upper  abdomen appear unremarkable.       Impression:       1. 2 cm masslike density in the medial right lung base, CT of chest  without contrast is recommended for follow-up to exclude neoplasm. A  small nodular density at the left lung base may represent a nipple  shadow. This can be evaluated on CT as well. COPD.     REPORT was flagged for attention to the emergency department.  This report was finalized on 03/15/2018 13:48 by Dr. James Solorzano MD.    CT Head Without Contrast [[id number]] Collected:  03/15/18 1306     Updated:  03/15/18 1317    Narrative:       EXAMINATION: CT HEAD WO CONTRAST-  3/15/2018 1:06 PM CDT     CT SCAN OF THE HEAD, WITHOUT CONTRAST:      HISTORY: Confusion/delirium, altered level of consciousness, unexplained     COMPARISONS: 1/11/2015 head CT      TECHNIQUE:     Radiation dose equals DLP 1115 mGy-cm.  Automated exposure control dose  reduction technique was implemented.        CT evaluation of the head without intravenous contrast. 5 mm transaxial  images were obtained.   2-D sagittal and coronal reconstruction images  were generated.     FINDINGS: Image quality degradation related to streak-like motion  artifact appreciated.     There is diffuse central and cortical atrophy.     There is low-attenuation in the periventricular and subcortical white  matter compatible with chronic ischemic changes.      There are small bilateral subdural collections, slightly greater  attenuation than CSF. Suspect chronic subdural hemorrhagic changes. The  subdural collection along the right convexity is slightly greater  measuring nearly 5 mm in thickness. The left subdural collection  measures approximately 3.5 mm in thickness along the convexity.     There is no intraaxial hemorrhage. There is no acute extra-axial  hemorrhage.     There is no mass effect or midline shift. There is no hydrocephalus.     Bone window imaging reveals no calvarial abnormality.     Paranasal sinuses imaged in part are clear.          Impression:       1. Small bilateral subdural collections, attenuation slightly greater  than CSF, suspect chronic subdural hemorrhagic change  2. No acute hemorrhage..  3. Atrophy and chronic ischemic white matter change.              This report was finalized on 03/15/2018 13:14 by Dr. Trevor Amaya MD.          I have reviewed the patient current medications.     Assessment/Plan     Hospital Problem List     Elevated TSH          Assessment    Metabolic encephalopathy  Do not know if there is any underlying demential issues as there is no family here for interview.  Markedly elevated TSH in a patient with known hypothyroidism.  Unknown if she is actually taking her medication.  Hypertension.  Patient is on lisinopril, unknown if actually taking.   Abnormal CT head, bilateral subdural v hygromas, neurosurgery has seen in the ER  Fall at home, patient found in floor.  No evaluation of cervical spine done in ER  Will CT neck.   Abnormal chest xray will need outpatient follow up with non contrasted CT of chest.   Hypothermia    Plan    Admit to the ICU  CT neck  IVF  Rewarm patient  Resume home medications.   Follow up of abnl CT head by neurosurgery  AM LAB  CMP CBC   Check t4t3  Neuro evaluations with vital signs.            Discharge Planning: Unable to plan at this time.      Cassie Matthews DO   03/15/18   3:30  PM      Electronically signed by Cassie Matthews DO at 3/15/2018  4:24 PM     Elroy Stephens MD at 3/15/2018  3:05 PM                Patient Care Team:  Jorgito Muñoz DO as PCP - General (Family Medicine)  Jorgito Muñoz DO as PCP - Claims Attributed  Jose Koenig MD as Consulting Physician (Nephrology)    Chief complaint altered mental status    Subjective     HPI: HPI    This patient is an 84-year-old cachectic malnourished female who is brought in by her family with altered mental status.  There currently is no family present at bedside.  By emergency department report, her son was present earlier today and states that she has had sudden change in her mental status.  The patient is unable to provide any details regarding her history of present illness.    Review of Systems     A 12 point review of systems is unable to be obtained secondary to patient's altered mental status    Past Medical History:   Diagnosis Date   • Abnormal SPEP    • Arthritis    • Chronic constipation    • COPD (chronic obstructive pulmonary disease)    • CRD (chronic renal disease), stage III     stage III   • Diabetes mellitus    • Disease of thyroid gland     HYPOTHYROIDISM   • Hx of macrocytic anemia    • Hyperlipidemia    • Hypertension    • Hypoglycemia    • Losing weight    • Macrocytic anemia    • Nicotine abuse    • Vitamin D deficiency      Past Surgical History:   Procedure Laterality Date   • BRAIN SURGERY      Head injury as a child     Family History   Problem Relation Age of Onset   • No Known Problems Mother    • Diabetes Father      Social History   Substance Use Topics   • Smoking status: Current Every Day Smoker     Packs/day: 1.00     Types: Cigarettes   • Smokeless tobacco: Never Used   • Alcohol use No       (Not in a hospital admission)  Allergies:  Review of patient's allergies indicates no known allergies.      Objective    Neurologic Exam  Vital Signs  Temp:  [91.5 °F (33.1 °C)-94.6  °F (34.8 °C)] 94.6 °F (34.8 °C)  Heart Rate:  [57-83] 78  Resp:  [18-20] 20  BP: (119-206)/(44-91) 121/91    Physical Exam    Appears cachectic and malnourished  No acute distress  Awakens to voice  States name  HEENT atraumatic, normocephalic  Neck supple  Abdomen soft, nontender  Extremities no clubbing, edema, cyanosis  Neurologic exam  Cranial nerves II through XII grossly intact  Patient moves all extremities against gravity  Plus or minus follows commands  No focal neurologic deficit  Sensation appears intact light touch in upper and lower externus  Detailed neurologic exam limited by patient's altered mental status  No cerebellar findings  No long tract signs    Results Review:   I reviewed the patient's new clinical results.  I reviewed the patient's new imaging results and agree with the interpretation.  I reviewed the patient's other test results and agree with the interpretation      Lab Results (last 24 hours)     Procedure Component Value Units Date/Time    CK [[id number]]  (Abnormal) Collected:  03/15/18 1202    Specimen:  Blood Updated:  03/15/18 1347     Creatine Kinase 1,077 (H) U/L     TSH [[id number]]  (Abnormal) Collected:  03/15/18 1202    Specimen:  Blood Updated:  03/15/18 1307     TSH 52.900 (H) mIU/mL     Comprehensive Metabolic Panel [[id number]]  (Abnormal) Collected:  03/15/18 1202    Specimen:  Blood Updated:  03/15/18 1249     Glucose 109 (H) mg/dL      BUN 36 (H) mg/dL      Comment: Specimen hemolyzed. Results may be affected.        Creatinine 1.93 (H) mg/dL      Sodium 145 mmol/L      Potassium 5.6 (H) mmol/L      Comment: Specimen hemolyzed.  Results may be affected.        Chloride 108 mmol/L      CO2 19.0 (L) mmol/L      Calcium 9.7 mg/dL      Total Protein 10.4 (H) g/dL      Albumin 4.20 g/dL      ALT (SGPT) 34 U/L      Comment: Specimen hemolyzed.  Results may be affected.        AST (SGOT) 74 (H) U/L      Comment: Specimen hemolyzed.  Results may be affected.        Alkaline  Phosphatase 96 U/L      Comment: Specimen hemolyzed. Results may be affected.        Total Bilirubin 1.0 mg/dL      eGFR  African Amer 30 (L) mL/min/1.73      Globulin 6.2 gm/dL      A/G Ratio 0.7 (L) g/dL      BUN/Creatinine Ratio 18.7     Anion Gap 18.0 (H) mmol/L     Narrative:       The MDRD GFR formula is only valid for adults with stable renal function between ages 18 and 70.    Lactic Acid, Plasma [[id number]]  (Abnormal) Collected:  03/15/18 1202    Specimen:  Blood Updated:  03/15/18 1249     Lactate 2.9 (C) mmol/L     Lactic Acid, Reflex Timer (This will reflex a repeat order 3-3:15 hours after ordered.) [[id number]] Collected:  03/15/18 1202    Specimen:  Blood Updated:  03/15/18 1249    Troponin [[id number]]  (Normal) Collected:  03/15/18 1202    Specimen:  Blood Updated:  03/15/18 1248     Troponin I 0.016 ng/mL     Lipase [[id number]]  (Normal) Collected:  03/15/18 1202    Specimen:  Blood Updated:  03/15/18 1236     Lipase 148 U/L     Urinalysis With / Culture If Indicated - Urine, Catheter [[id number]]  (Abnormal) Collected:  03/15/18 1218    Specimen:  Urine from Urine, Catheter Updated:  03/15/18 1230     Color, UA Yellow     Appearance, UA Clear     pH, UA <=5.0     Specific Gravity, UA 1.017     Glucose, UA Negative     Ketones, UA Trace (A)     Bilirubin, UA Negative     Blood, UA Moderate (2+) (A)     Protein,  mg/dL (2+) (A)     Leuk Esterase, UA Negative     Nitrite, UA Negative     Urobilinogen, UA 0.2 E.U./dL    Urinalysis, Microscopic Only - Urine, Clean Catch [[id number]]  (Abnormal) Collected:  03/15/18 1218    Specimen:  Urine from Urine, Catheter Updated:  03/15/18 1230     RBC, UA 0-2 (A) /HPF      WBC, UA 0-2 (A) /HPF      Bacteria, UA None Seen /HPF      Squamous Epithelial Cells, UA None Seen /HPF      Hyaline Casts, UA 0-2 /LPF      Methodology Automated Microscopy    Protime-INR [[id number]]  (Abnormal) Collected:  03/15/18 1202    Specimen:  Blood Updated:  03/15/18 1227      Protime 12.3 Seconds      INR 0.89 (L)    CBC & Differential [[id number]] Collected:  03/15/18 1202    Specimen:  Blood Updated:  03/15/18 1219    Narrative:       The following orders were created for panel order CBC & Differential.  Procedure                               Abnormality         Status                     ---------                               -----------         ------                     CBC Auto Differential[[id number]]        Abnormal            Final result                 Please view results for these tests on the individual orders.    CBC Auto Differential [[id number]]  (Abnormal) Collected:  03/15/18 1202    Specimen:  Blood Updated:  03/15/18 1219     WBC 7.46 10*3/mm3      RBC 3.55 (L) 10*6/mm3      Hemoglobin 12.0 g/dL      Hematocrit 36.8 (L) %      .7 (H) fL      MCH 33.8 (H) pg      MCHC 32.6 (L) g/dL      RDW 14.0 %      RDW-SD 53.3 fl      MPV 10.0 fL      Platelets 261 10*3/mm3      Neutrophil % 80.4 (H) %      Lymphocyte % 13.0 (L) %      Monocyte % 4.6 %      Eosinophil % 0.0 %      Basophil % 0.3 %      Immature Grans % 1.7 %      Neutrophils, Absolute 6.00 10*3/mm3      Lymphocytes, Absolute 0.97 10*3/mm3      Monocytes, Absolute 0.34 10*3/mm3      Eosinophils, Absolute 0.00 10*3/mm3      Basophils, Absolute 0.02 10*3/mm3      Immature Grans, Absolute 0.13 (H) 10*3/mm3      nRBC 0.3 (H) /100 WBC     Blood Culture - Blood, [[id number]] Collected:  03/15/18 1202    Specimen:  Blood from Arm, Left Updated:  03/15/18 1219    Blood Culture - Blood, [[id number]] Collected:  03/15/18 1202    Specimen:  Blood from Arm, Right Updated:  03/15/18 1218    Blood Gas, Arterial [[id number]]  (Abnormal) Collected:  03/15/18 1155    Specimen:  Arterial Blood Updated:  03/15/18 1200     Site Right Radial     Garcia's Test Positive     pH, Arterial 7.392 pH units      pCO2, Arterial 34.3 (L) mm Hg      pO2, Arterial 57.4 (L) mm Hg      HCO3, Arterial 20.9 mmol/L      Base Excess, Arterial -3.5 (L)  mmol/L      O2 Saturation, Arterial 88.0 (L) %      Temperature 37.0 C      Barometric Pressure for Blood Gas 750 mmHg      Modality Room Air     Ventilator Mode NA     Collected by [id number]        Ct Head Without Contrast    Result Date: 3/15/2018  Narrative: EXAMINATION: CT HEAD WO CONTRAST-  3/15/2018 1:06 PM CDT  CT SCAN OF THE HEAD, WITHOUT CONTRAST:  HISTORY: Confusion/delirium, altered level of consciousness, unexplained  COMPARISONS: 1/11/2015 head CT  TECHNIQUE:  Radiation dose equals DLP 1115 mGy-cm.  Automated exposure control dose reduction technique was implemented.   CT evaluation of the head without intravenous contrast. 5 mm transaxial images were obtained.   2-D sagittal and coronal reconstruction images were generated.  FINDINGS: Image quality degradation related to streak-like motion artifact appreciated.  There is diffuse central and cortical atrophy.  There is low-attenuation in the periventricular and subcortical white matter compatible with chronic ischemic changes.  There are small bilateral subdural collections, slightly greater attenuation than CSF. Suspect chronic subdural hemorrhagic changes. The subdural collection along the right convexity is slightly greater measuring nearly 5 mm in thickness. The left subdural collection measures approximately 3.5 mm in thickness along the convexity.  There is no intraaxial hemorrhage. There is no acute extra-axial hemorrhage.  There is no mass effect or midline shift. There is no hydrocephalus.  Bone window imaging reveals no calvarial abnormality.  Paranasal sinuses imaged in part are clear.       Impression: 1. Small bilateral subdural collections, attenuation slightly greater than CSF, suspect chronic subdural hemorrhagic change 2. No acute hemorrhage.. 3. Atrophy and chronic ischemic white matter change.     This report was finalized on 03/15/2018 13:14 by Dr. Trevor Amaya MD.    Xr Chest 1 View    Result Date: 3/15/2018  Narrative: EXAMINATION:  XR CHEST 1 VW- 3/15/2018 1:43 PM CDT  HISTORY: AMS.  REPORT: Comparison is made with the study from 1/11/2015.  The lungs are hyperinflated, no infiltrate is identified. There is a small nodular density at the left base which is probably a nipple shadow. There is a small masslike density in the medial right lung base that measures approximately 2 cm. Heart size is normal. No pneumothorax or pleural effusion is identified. The osseous structures and upper abdomen appear unremarkable.      Impression: 1. 2 cm masslike density in the medial right lung base, CT of chest without contrast is recommended for follow-up to exclude neoplasm. A small nodular density at the left lung base may represent a nipple shadow. This can be evaluated on CT as well. COPD.  REPORT was flagged for attention to the emergency department. This report was finalized on 03/15/2018 13:48 by Dr. James Solorzano MD.        Patient Active Problem List   Diagnosis   • Vitamin D deficiency   • Losing weight   • Hypoglycemia   • Essential hypertension   • Mixed hyperlipidemia   • Acquired hypothyroidism   • Diabetes mellitus   • Chronic kidney disease, stage III (moderate)   • COPD (chronic obstructive pulmonary disease)   • Chronic constipation   • Arthritis   • Abnormal SPEP   • Macrocytic anemia   • Tobacco abuse   • Vascular dementia without behavioral disturbance   • Elevated TSH       Assessment/Plan     84-year-old female with malnutrition and altered mental status.  Patient appears to be in poor physical health.  Her CT head shows bilateral chronic hygromas vs subdural hematoma without mass effect.  These can be observed with serial imaging.  No acute neurosurgical intervention at this time.  Patient is being admitted to the ICU for workup of his delirium.  No family currently present at bedside.  Will follow along.    I discussed the patients findings and my recommendations with patient    Elroy Stephens MD  03/15/18  3:05  PM      Electronically signed by Elroy Stephens MD at 3/15/2018  3:11 PM       Prior to Admission Medications     Prescriptions Last Dose Informant Patient Reported? Taking?    ferrous sulfate 325 (65 FE) MG tablet Past Week Pharmacy No Yes    Take 1 tablet by mouth 3 (Three) Times a Day With Meals.    levothyroxine (SYNTHROID, LEVOTHROID) 100 MCG tablet Past Week Pharmacy No Yes    Take 1 tablet by mouth Daily.    lisinopril (PRINIVIL,ZESTRIL) 10 MG tablet Past Week Pharmacy Yes Yes    Take 10 mg by mouth 2 (Two) Times a Day.          Hospital Medications (active)       Dose Frequency Start End    aluminum-magnesium hydroxide-simethicone (MAALOX MAX) 400-400-40 MG/5ML suspension 15 mL 15 mL Every 6 Hours PRN 3/15/2018     Sig - Route: Take 15 mL by mouth Every 6 (Six) Hours As Needed for Heartburn. - Oral    dextrose 5 % and sodium chloride 0.45 % infusion 100 mL/hr Continuous 3/16/2018     Sig - Route: Infuse 100 mL/hr into a venous catheter Continuous. - Intravenous    ferrous sulfate tablet 325 mg 325 mg 3 Times Daily With Meals 3/15/2018     Sig - Route: Take 1 tablet by mouth 3 (Three) Times a Day With Meals. - Oral    hydrALAZINE (APRESOLINE) injection 10 mg 10 mg Every 6 Hours PRN 3/16/2018     Sig - Route: Infuse 0.5 mL into a venous catheter Every 6 (Six) Hours As Needed for High Blood Pressure. - Intravenous    levothyroxine sodium injection 150 mcg 150 mcg Every Early Morning 3/16/2018     Sig - Route: Infuse 7.5 mL into a venous catheter Every Morning. - Intravenous    lisinopril (PRINIVIL,ZESTRIL) tablet 10 mg 10 mg Daily 3/15/2018     Sig - Route: Take 1 tablet by mouth Daily. - Oral    LORazepam (ATIVAN) injection 0.5 mg 0.5 mg Once 3/16/2018     Sig - Route: Infuse 0.25 mL into a venous catheter 1 (One) Time. - Intravenous    LORazepam (ATIVAN) injection 1 mg 1 mg Every 12 Hours PRN 3/17/2018 3/27/2018    Sig - Route: Infuse 0.5 mL into a venous catheter Every 12 (Twelve) Hours As Needed for  "Anxiety. - Intravenous    methylPREDNISolone sodium succinate (SOLU-Medrol) injection 40 mg 40 mg Every 8 Hours 3/18/2018     Sig - Route: Infuse 1 mL into a venous catheter Every 8 (Eight) Hours. - Intravenous    ondansetron (ZOFRAN) injection 4 mg 4 mg Every 6 Hours PRN 3/15/2018     Sig - Route: Infuse 2 mL into a venous catheter Every 6 (Six) Hours As Needed for Nausea or Vomiting. - Intravenous    sodium chloride 0.9 % flush 1-10 mL 1-10 mL As Needed 3/15/2018     Sig - Route: Infuse 1-10 mL into a venous catheter As Needed for Line Care. - Intravenous    sodium chloride 0.9 % flush 10 mL 10 mL As Needed 3/15/2018     Sig - Route: Infuse 10 mL into a venous catheter As Needed for Line Care. - Intravenous    Linked Group 1:  \"And\" Linked Group Details        methylPREDNISolone sodium succinate (SOLU-Medrol) injection 80 mg (Discontinued) 80 mg Every 8 Hours 3/16/2018 3/18/2018    Sig - Route: Infuse 1.28 mL into a venous catheter Every 8 (Eight) Hours. - Intravenous    Morphine infusion 1 mg/mL (Discontinued) 1-20 mg/hr Continuous 3/18/2018 3/18/2018    Sig - Route: Infuse 1-20 mg/hr into a venous catheter Continuous. - Intravenous    Morphine sulfate (PF) injection 1 mg (Discontinued) 1 mg Continuous 3/18/2018 3/18/2018    Sig - Route: Infuse 0.5 mL into a venous catheter Continuous. - Intravenous          Operative/Procedure Notes (most recent note)     No notes of this type exist for this encounter.           Physician Progress Notes (most recent note)      Cassie Matthews DO at 3/18/2018 11:09 AM              [facility] Medicine Services  INPATIENT PROGRESS NOTE    Length of Stay: 3  Date of Admission: 3/15/2018  Primary Care Physician: Jorgito Muñoz DO    Subjective   Chief Complaint: Patient arouses to verbal stimulation.  Says no to all questions.    HPI   Unable to obtain due to patients mental status.  Significant dementia.      Review of Systems   Unable due " to dementia.    Patient was transferred to comfort measures during night.      Objective    Temp:  [96.6 °F (35.9 °C)-97.2 °F (36.2 °C)] 97.2 °F (36.2 °C)  Heart Rate:  [43-93] 93  Resp:  [9-18] 16  BP: ()/(35-85) 89/55  Physical Exam   Constitutional: She appears well-developed.   HENT:   Head: Normocephalic and atraumatic.   Eyes: Conjunctivae and EOM are normal. Pupils are equal, round, and reactive to light. No scleral icterus.   Neck: Neck supple. No JVD present.   Cardiovascular: Normal rate and regular rhythm.  Exam reveals no gallop and no friction rub.    No murmur heard.  Pulmonary/Chest: Effort normal and breath sounds normal.   Abdominal: Soft. Bowel sounds are normal. There is no hepatosplenomegaly. There is no tenderness.   Musculoskeletal: Normal range of motion. She exhibits no edema.   Neurological: She is alert. No cranial nerve deficit.   Awakened to verbal stimuli, smiles and answers no to all questions.  Does not follow command.  No focal deficit.    Skin: Skin is warm and dry.   Psychiatric: She has a normal mood and affect.   Nursing note and vitals reviewed.          Results Review:  I have reviewed the labs, radiology results, and diagnostic studies.    Laboratory Data:     Results from last 7 days  Lab Units 03/17/18  0633 03/16/18  0443 03/15/18  1202   WBC 10*3/mm3 10.51 7.04 7.46   HEMOGLOBIN g/dL 9.0* 11.0* 12.0   HEMATOCRIT % 27.0* 33.2* 36.8*   PLATELETS 10*3/mm3 203 245 261          Results from last 7 days  Lab Units 03/17/18  0633 03/16/18  0443 03/15/18  1836 03/15/18  1202   SODIUM mmol/L 145 150* 146* 145   POTASSIUM mmol/L 3.4* 4.1 4.4 5.6*   CHLORIDE mmol/L 113* 114* 112* 108   CO2 mmol/L 25.0 22.0* 25.0 19.0*   BUN mg/dL 27* 34* 36* 36*   CREATININE mg/dL 1.43* 1.82* 2.07* 1.93*   CALCIUM mg/dL 7.8* 8.5 8.7 9.7   BILIRUBIN mg/dL 0.3 0.5  --  1.0   ALK PHOS U/L 58 69  --  96   ALT (SGPT) U/L 28 31  --  34   AST (SGOT) U/L 45 57*  --  74*   GLUCOSE mg/dL 217* 219* 56*  109*       Culture Data:   Blood Culture   Date Value Ref Range Status   03/15/2018 No growth at 2 days  Preliminary   03/15/2018 Abnormal Stain (A)  Preliminary   03/15/2018 Gram Positive Cocci (A)  Preliminary       Radiology Data:   Imaging Results (last 24 hours)     ** No results found for the last 24 hours. **          I have reviewed the patient current medications.     Assessment/Plan     Hospital Problem List     Elevated TSH          Assessment    Metabolic encephalopathy   Markedly elevated TSH in a patient with known hypothyroidism. Does not take medication  Hypertension.   Abnormal CT head, bilateral subdural v hygromas  Fall at home, patient found in floor.    Abnormal chest xray   Hypothermia, resolved      Plan    Social service for placement  Continue comfort measures.        Discharge Planning: I expect the patient to be discharged to NH.    Cassie Matthews DO   03/18/18   11:09 AM      Electronically signed by Cassie Matthews DO at 3/18/2018 11:19 AM       Consult Notes (most recent note)     No notes of this type exist for this encounter.           Nutrition Notes (most recent note)      Catrina De Jesus at 3/16/2018  3:56 PM          Problem: Patient Care Overview  Goal: Plan of Care Review  Outcome: Ongoing (interventions implemented as appropriate)   03/16/18 8305   OTHER   Outcome Summary NPO, failed bedside dysphagia evaluation. If unable to advance and tolerate oral diet, may benefit from amons. con to follow       Problem: Nutrition, Imbalanced: Inadequate Oral Intake (Adult)  Goal: Identify Related Risk Factors and Signs and Symptoms  Outcome: Ongoing (interventions implemented as appropriate)          Electronically signed by Catrina De Jesus at 3/16/2018  3:56 PM       Physical Therapy Notes (most recent note)     No notes of this type exist for this encounter.        Occupational Therapy Notes (most recent note)     No notes of this type exist for this encounter.           Speech  Language Pathology Notes (most recent note)      Mayuri Retana MS CCC-SLP at 3/18/2018  9:41 AM          Acute Care - Speech Language Pathology Discharge Summary   [place]       Patient Name: Saúl Santiago  : 1933  MRN: [id number]    Today's Date: 3/18/2018                   Admit Date: 3/15/2018      SLP Recommendation and Plan    Visit Dx:    ICD-10-CM ICD-9-CM   1. Elevated TSH R94.6 794.5   2. Oropharyngeal dysphagia R13.12 787.22   3. Altered mental status, unspecified altered mental status type R41.82 780.97   4. Hypothermia, initial encounter T68.XXXA 991.6   5. Non-traumatic rhabdomyolysis M62.82 728.88   6. SHARRI (acute kidney injury) N17.9 584.9   7. Bilateral subdural hematomas S06.5X9A 432.1   8. Lung mass R91.8 786.6   9. Hyperkalemia E87.5 276.7                     SLP GOALS     Row Name 18 0933 18 1023 18 0846       Oral Nutrition/Hydration Goal 1 (SLP)    Oral Nutrition/Hydration Goal 1, SLP Pt will tolerate least restrictive diet with no overt s/s of aspiration.  -KW Pt will tolerate least restrictive diet with SLP with no overt s/s of aspiration  -BN Pt will tolerate least restrictive diet with no overt s/s of aspiration.  -MB    Time Frame (Oral Nutrition/Hydration Goal 1, SLP) short term goal (STG);by discharge  -KW short term goal (STG);by discharge  -BN short term goal (STG);by discharge  -MB    Barriers (Oral Nutrition/Hydration Goal 1, SLP) Decline in status  -KW cognitive status  -BN cognitive status  -MB    Progress/Outcomes (Oral Nutrition/Hydration Goal 1, SLP) goal no longer appropriate;medical status inhibited participation  -KW goal ongoing;continuing progress toward goal  -BN unable to make needed progress;goal ongoing  -MB       Lingual Strengthening Goal 1 (SLP)    Activity (Lingual Strengthening Goal 1, SLP) increase lingual tone/sensation/control/coordination/movement;increase tongue back strength  -KW  -- increase lingual  tone/sensation/control/coordination/movement;increase tongue back strength  -MB    Increase Lingual Tone/Sensation/Control/Coordination/Movement lingual resistance exercises  -KW  -- lingual resistance exercises  -MB    Increase Tongue Back Strength lingual movement exercises  -KW  -- lingual movement exercises  -MB    [place]/Accuracy (Lingual Strengthening Goal 1, SLP) independently (over 90% accuracy)  -KW  -- independently (over 90% accuracy)  -MB    Time Frame (Lingual Strengthening Goal 1, SLP) short term goal (STG);by discharge  -KW  -- short term goal (STG);by discharge  -MB    Barriers (Lingual Strengthening Goal 1, SLP)  Decline in status  -KW  -- n/a  -MB    Progress/Outcomes (Lingual Strengthening Goal 1, SLP) goal no longer appropriate;medical status inhibited participation  -KW  -- goal ongoing  -MB       Pharyngeal Strengthening Exercise Goal 1 (SLP)    Activity (Pharyngeal Strengthening Goal 1, SLP) increase timing;increase squeeze/positive pressure generation;increase tongue base retraction  -KW  -- increase timing;increase squeeze/positive pressure generation;increase tongue base retraction  -MB    Increase Timing gustatory stimulation (sour/cold)  -KW  -- gustatory stimulation (sour/cold)  -MB    Increase Squeeze/Positive Pressure Generation hard effortful swallow  -KW  -- hard effortful swallow  -MB    Increase Tongue Base Retraction keira  -KW  -- keira  -MB    [place]/Accuracy (Pharyngeal Strengthening Goal 1, SLP) independently (over 90% accuracy)  -KW  -- independently (over 90% accuracy)  -MB    Time Frame (Pharyngeal Strengthening Goal 1, SLP) short term goal (STG);by discharge  -KW  -- short term goal (STG);by discharge  -MB    Barriers (Pharyngeal Strengthening Goal 1, SLP)  Decline in status  -KW  -- n/a  -MB    Progress/Outcomes (Pharyngeal Strengthening Goal 1, SLP) goal no longer appropriate;medical status inhibited participation  -KW  -- unable to make needed  progress;goal ongoing  -MB    Row Name 03/15/18 1301             Oral Nutrition/Hydration Goal 1 (SLP)    Oral Nutrition/Hydration Goal 1, SLP Pt will tolerate least restrictive diet with no overt s/s of aspiration.  -MB      Time Frame (Oral Nutrition/Hydration Goal 1, SLP) short term goal (STG);by discharge  -MB      Barriers (Oral Nutrition/Hydration Goal 1, SLP) n/a  -MB      Progress/Outcomes (Oral Nutrition/Hydration Goal 1, SLP) goal ongoing  -MB         Lingual Strengthening Goal 1 (SLP)    Activity (Lingual Strengthening Goal 1, SLP) increase lingual tone/sensation/control/coordination/movement;increase tongue back strength  -MB      Increase Lingual Tone/Sensation/Control/Coordination/Movement lingual resistance exercises  -MB      Increase Tongue Back Strength lingual movement exercises  -MB      [place]/Accuracy (Lingual Strengthening Goal 1, SLP) independently (over 90% accuracy)  -MB      Time Frame (Lingual Strengthening Goal 1, SLP) short term goal (STG);by discharge  -MB      Barriers (Lingual Strengthening Goal 1, SLP) n/a  -MB      Progress/Outcomes (Lingual Strengthening Goal 1, SLP) goal ongoing  -MB         Pharyngeal Strengthening Exercise Goal 1 (SLP)    Activity (Pharyngeal Strengthening Goal 1, SLP) increase timing;increase squeeze/positive pressure generation;increase tongue base retraction  -MB      Increase Timing gustatory stimulation (sour/cold)  -MB      Increase Squeeze/Positive Pressure Generation hard effortful swallow  -MB      Increase Tongue Base Retraction keira  -MB      [place]/Accuracy (Pharyngeal Strengthening Goal 1, SLP) independently (over 90% accuracy)  -MB      Time Frame (Pharyngeal Strengthening Goal 1, SLP) short term goal (STG);by discharge  -MB      Barriers (Pharyngeal Strengthening Goal 1, SLP) n/a  -MB      Progress/Outcomes (Pharyngeal Strengthening Goal 1, SLP) goal ongoing  -MB        User Key  (r) = Recorded By, (t) = Taken By, (c) = Cosigned By     Initials Name Provider Type    SANTY Levy, CCC-SLP Speech and Language Pathologist    KW Mayuri Retana MS CCC-SLP Speech and Language Pathologist    KARMA Bright, CCC-SLP Speech and Language Pathologist                  SLP Discharge Summary  Anticipated Dischage Disposition: skilled nursing facility  Reason for Discharge: no further expectation of functional progress  Progress Toward Achieving Short/long Term Goals: unable to tolerate or actively participate in therapy, unable to make functional progress at this time  Discharge Destination: other (see comments) (comfort care)      Mayuri Retana MS CCC-SLP  3/18/2018    Electronically signed by Mayuri Retana MS CCC-SLP at 3/18/2018  9:41 AM       Respiratory Therapy Notes (most recent note)     No notes of this type exist for this encounter.

## 2018-03-19 PROCEDURE — 25010000002 METHYLPREDNISOLONE PER 40 MG: Performed by: FAMILY MEDICINE

## 2018-03-19 RX ORDER — POTASSIUM CHLORIDE 750 MG/1
10 CAPSULE, EXTENDED RELEASE ORAL ONCE
Status: COMPLETED | OUTPATIENT
Start: 2018-03-19 | End: 2018-03-19

## 2018-03-19 RX ORDER — UREA 10 %
100 LOTION (ML) TOPICAL DAILY
Status: DISCONTINUED | OUTPATIENT
Start: 2018-03-19 | End: 2018-03-20 | Stop reason: HOSPADM

## 2018-03-19 RX ORDER — FOLIC ACID 1 MG/1
1 TABLET ORAL DAILY
Status: DISCONTINUED | OUTPATIENT
Start: 2018-03-19 | End: 2018-03-20 | Stop reason: HOSPADM

## 2018-03-19 RX ORDER — METHYLPREDNISOLONE SODIUM SUCCINATE 40 MG/ML
20 INJECTION, POWDER, LYOPHILIZED, FOR SOLUTION INTRAMUSCULAR; INTRAVENOUS EVERY 12 HOURS
Status: DISCONTINUED | OUTPATIENT
Start: 2018-03-20 | End: 2018-03-20 | Stop reason: HOSPADM

## 2018-03-19 RX ADMIN — FERROUS SULFATE TAB 325 MG (65 MG ELEMENTAL FE) 325 MG: 325 (65 FE) TAB at 19:13

## 2018-03-19 RX ADMIN — DEXTROSE AND SODIUM CHLORIDE 100 ML/HR: 5; 450 INJECTION, SOLUTION INTRAVENOUS at 12:07

## 2018-03-19 RX ADMIN — METHYLPREDNISOLONE SODIUM SUCCINATE 40 MG: 40 INJECTION, POWDER, FOR SOLUTION INTRAMUSCULAR; INTRAVENOUS at 03:45

## 2018-03-19 RX ADMIN — VITAMIN B12 0.1 MG ORAL TABLET 100 MCG: 0.1 TABLET ORAL at 19:13

## 2018-03-19 RX ADMIN — DEXTROSE AND SODIUM CHLORIDE 100 ML/HR: 5; 450 INJECTION, SOLUTION INTRAVENOUS at 00:58

## 2018-03-19 RX ADMIN — METHYLPREDNISOLONE SODIUM SUCCINATE 40 MG: 40 INJECTION, POWDER, FOR SOLUTION INTRAMUSCULAR; INTRAVENOUS at 12:07

## 2018-03-19 RX ADMIN — POTASSIUM CHLORIDE 10 MEQ: 750 CAPSULE, EXTENDED RELEASE ORAL at 19:16

## 2018-03-19 RX ADMIN — FOLIC ACID 1 MG: 1 TABLET ORAL at 19:13

## 2018-03-19 NOTE — PROGRESS NOTES
1           [facility] Medicine Services  INPATIENT PROGRESS NOTE    Length of Stay: 4  Date of Admission: 3/15/2018  Primary Care Physician: Jorgito Muñoz DO    Subjective   Chief Complaint:  Follow-up     HPI   She is an 84-year-old -American woman who was found down at home per nursing.  She had an abnormal head CT scan to which Dr. Stephens had seen in consult.  This showed showed bilateral chronic hygromas versus subdural hematoma without mass effect.  No acute surgical intervention was recommended    Her blood pressure had been labile.  For the most she is hypertensive  Serum sodium has decreased to 145.  Serum creatinine likewise improved.    She carries history of dementia and unable to give any concrete details. information  States she is hungry and eats everything  Review of Systems   She denies any pain, difficulty breathing, chest pain, shortness of breath      Objective    Temp:  [97.5 °F (36.4 °C)-97.8 °F (36.6 °C)] 97.8 °F (36.6 °C)  Heart Rate:  [65-67] 67  Resp:  [16] 16  BP: (175-177)/(40-53) 177/53  Physical Exam  Ammoniacal odor  No apparent distress, answers appropriately, alert and oriented to self  Atrophic muscles  Head: Normocephalic and atraumatic.   Eyes: Conjunctivae and EOM are normal. Pupils are equal, round, and reactive to light. No scleral icterus.   Neck: Neck supple. No JVD present.   Cardiovascular: Normal rate and regular rhythm.  Exam reveals no gallop and no friction rub.    No murmur heard.  Pulmonary/Chest: Effort normal and breath sounds normal.   Abdominal: Soft. Bowel sounds are normal. There is no hepatosplenomegaly. There is no tenderness.   Musculoskeletal: Normal range of motion. She exhibits no edema.   Neurological: She is alert. No cranial nerve deficit.   Awaken, answers questions appropriately and follows commands   Skin: Skin is warm and dry.   Psychiatric: She has a normal mood and affect.   Nursing note and vitals  reviewed.        Results Review:  I have reviewed the labs, radiology results, and diagnostic studies.    Laboratory Data:     Results from last 7 days  Lab Units 03/17/18  0633 03/16/18  0443 03/15/18  1202   WBC 10*3/mm3 10.51 7.04 7.46   HEMOGLOBIN g/dL 9.0* 11.0* 12.0   HEMATOCRIT % 27.0* 33.2* 36.8*   PLATELETS 10*3/mm3 203 245 261          Results from last 7 days  Lab Units 03/17/18  0633 03/16/18  0443 03/15/18  1836 03/15/18  1202   SODIUM mmol/L 145 150* 146* 145   POTASSIUM mmol/L 3.4* 4.1 4.4 5.6*   CHLORIDE mmol/L 113* 114* 112* 108   CO2 mmol/L 25.0 22.0* 25.0 19.0*   BUN mg/dL 27* 34* 36* 36*   CREATININE mg/dL 1.43* 1.82* 2.07* 1.93*   CALCIUM mg/dL 7.8* 8.5 8.7 9.7   BILIRUBIN mg/dL 0.3 0.5  --  1.0   ALK PHOS U/L 58 69  --  96   ALT (SGPT) U/L 28 31  --  34   AST (SGOT) U/L 45 57*  --  74*   GLUCOSE mg/dL 217* 219* 56* 109*       Culture Data:   Blood Culture   Date Value Ref Range Status   03/15/2018 No growth at 4 days  Preliminary   03/15/2018 Micrococcus species (A)  Preliminary       Radiology Data:   Imaging Results (last 24 hours)     ** No results found for the last 24 hours. **          I have reviewed the patient current medications.     Assessment/Plan     Hospital Problem List     Elevated TSH          Metabolic encephalopathy   Markedly elevated TSH in a patient with known hypothyroidism. Does not take medication  Hypertension.   Abnormal CT head, bilateral subdural v hygromas - no surgical intervention  Fall at home, patient found in floor.    Abnormal chest xray   Hypothermia, resolved   Severe protein calorie malnutrition  Hypernatremia -resolved/ hyperkalemia resolved and now has hypokalemia  SHARRI improving   Macrocytosis with anemia - empirically treat with folate and B12  Rhabdomyolysis      She is clinically improved  She is DO NOT RESUSCITATE comfort care.  A bed has been offered to her at [facility].  We will plan discharge tomorrow  Start on puréed diet with honey thickened  liquid as tolerated  discussed with nursing  Will cont meds as she would allow tp take  Cut back on Solu-Medrol and subsequently switched to by mouth upon discharge and taper  Anticipate switch to by mouth levothyroxine upon discharge    Ren Bland MD   03/19/18   4:31 PM

## 2018-03-19 NOTE — PLAN OF CARE
Problem: Patient Care Overview  Goal: Plan of Care Review  Outcome: Ongoing (interventions implemented as appropriate)   03/19/18 0405   Coping/Psychosocial   Plan of Care Reviewed With patient   OTHER   Outcome Summary No complaints or indications of pain or nausea; Turn q2; incontinent; NPO    Plan of Care Review   Progress no change     Goal: Discharge Needs Assessment  Outcome: Ongoing (interventions implemented as appropriate)    Goal: Interprofessional Rounds/Family Conf  Outcome: Ongoing (interventions implemented as appropriate)      Problem: Fall Risk (Adult)  Goal: Identify Related Risk Factors and Signs and Symptoms  Outcome: Outcome(s) achieved Date Met: 03/19/18    Goal: Absence of Fall  Outcome: Ongoing (interventions implemented as appropriate)      Problem: Skin Injury Risk (Adult)  Goal: Identify Related Risk Factors and Signs and Symptoms  Outcome: Outcome(s) achieved Date Met: 03/19/18    Goal: Skin Health and Integrity  Outcome: Ongoing (interventions implemented as appropriate)      Problem: Confusion, Acute (Adult)  Goal: Identify Related Risk Factors and Signs and Symptoms  Outcome: Outcome(s) achieved Date Met: 03/19/18    Goal: Cognitive/Functional Impairments Minimized  Outcome: Ongoing (interventions implemented as appropriate)    Goal: Safety  Outcome: Ongoing (interventions implemented as appropriate)      Problem: Nutrition, Imbalanced: Inadequate Oral Intake (Adult)  Goal: Identify Related Risk Factors and Signs and Symptoms  Outcome: Outcome(s) achieved Date Met: 03/19/18      Problem: Dying Patient, Actively (Adult)  Goal: Identify Related Risk Factors and Signs and Symptoms  Outcome: Outcome(s) achieved Date Met: 03/19/18    Goal: Comfort/Pain Control  Outcome: Ongoing (interventions implemented as appropriate)    Goal: Peace/Preservation of Dignity During the Dying Process  Outcome: Ongoing (interventions implemented as appropriate)

## 2018-03-19 NOTE — PROGRESS NOTES
Malnutrition Severity Assessment    Patient Name:  Saúl Santiago  YOB: 1933  MRN: [id number]  Admit Date:  3/15/2018    Patient meets criteria for : Severe malnutrition    Comments:  Please attest if you agree with this assessment for severe malnutrition.    See below for details. Pt is NPO and comfort measures only.    Malnutrition Type: Chronic Illness Malnutrition     Malnutrition Type (last 8 hours)      Malnutrition Severity Assessment     Row Name 03/19/18 1623       Malnutrition Severity Assessment    Malnutrition Type Chronic Illness Malnutrition    Row Name 03/19/18 1623       Physical Signs of Malnutrition (Chronic)    Muscle Wasting Severe   temples, clavicle    Fat Loss Severe   periorbital region, upper arms    Secondary Physical Signs Present (comment)   poor skin turgor    Row Name 03/19/18 1623       Weight Status (Chronic)    BMI Severe (<16)   13.94    %IBW Severe (<70%)   64%    Weight Loss Severe (>5% / 1 mo)   16% x 2 months    Row Name 03/19/18 1623       Energy Intake Status (Chronic)    Energy Intake Mild (<75% / 5d)    Row Name 03/19/18 1623       Criteria Met (Must meet criteria for severity in at least 2 of these categories: M Wasting, Fat Loss, Fluid, Secondary Signs, Wt. Status, Intake)    Patient meets criteria for  Severe malnutrition          Electronically signed by:  Alana Castrejon RDN, LD  03/19/18 4:27 PM

## 2018-03-19 NOTE — PROGRESS NOTES
Continued Stay Note   [place]     Patient Name: Saúl Santiago  MRN: [id number]  Today's Date: 3/19/2018    Admit Date: 3/15/2018          Discharge Plan     Row Name 03/19/18 1615       Plan    Plan [facility]    Patient/Family in Agreement with Plan yes    Plan Comments PT. REC'D BED OFFERS FROM [facility]; PT'S SON, NINO CHOSE [facility].  WILL FOLLOW TO COORDINATE PT'S TRANSFER WHEN MEDICALLY STABLE FOR D/C.               Discharge Codes    No documentation.           ROBERTA Arreaga

## 2018-03-19 NOTE — PLAN OF CARE
Problem: Patient Care Overview  Goal: Plan of Care Review   03/19/18 1602   OTHER   Outcome Summary PT. IS COMFORT MEASURES ONLY. REPOSITIONED FREQUENTLY. NO C/O PAIN VOICED. MEPILEX REMAINS @ COCCYX AREA. PT. NPO. IV FLUIDS INFUSING PER ORDER. CONT. TO WEAR O2 PER NASAL CANNULA. NO DISTRESS NOTED.       Problem: Fall Risk (Adult)  Goal: Absence of Fall  Outcome: Ongoing (interventions implemented as appropriate)      Problem: Skin Injury Risk (Adult)  Goal: Skin Health and Integrity  Outcome: Ongoing (interventions implemented as appropriate)      Problem: Confusion, Acute (Adult)  Goal: Cognitive/Functional Impairments Minimized  Outcome: Ongoing (interventions implemented as appropriate)    Goal: Safety  Outcome: Ongoing (interventions implemented as appropriate)      Problem: Dying Patient, Actively (Adult)  Goal: Comfort/Pain Control  Outcome: Ongoing (interventions implemented as appropriate)

## 2018-03-20 VITALS
OXYGEN SATURATION: 97 % | TEMPERATURE: 97.6 F | WEIGHT: 76.2 LBS | SYSTOLIC BLOOD PRESSURE: 156 MMHG | HEIGHT: 62 IN | HEART RATE: 58 BPM | DIASTOLIC BLOOD PRESSURE: 49 MMHG | RESPIRATION RATE: 18 BRPM | BODY MASS INDEX: 14.02 KG/M2

## 2018-03-20 PROBLEM — E87.0 HYPERNATREMIA: Status: ACTIVE | Noted: 2018-03-20

## 2018-03-20 PROBLEM — R62.7 FAILURE TO THRIVE IN ADULT: Status: ACTIVE | Noted: 2018-03-20

## 2018-03-20 PROBLEM — Z91.199 MEDICAL NON-COMPLIANCE: Status: ACTIVE | Noted: 2018-03-20

## 2018-03-20 PROBLEM — E43 PROTEIN-CALORIE MALNUTRITION, SEVERE (HCC): Status: ACTIVE | Noted: 2018-03-20

## 2018-03-20 PROBLEM — G93.41 METABOLIC ENCEPHALOPATHY: Status: ACTIVE | Noted: 2018-03-20

## 2018-03-20 LAB
BACTERIA SPEC AEROBE CULT: ABNORMAL
BACTERIA SPEC AEROBE CULT: ABNORMAL
BACTERIA SPEC AEROBE CULT: NORMAL
GRAM STN SPEC: ABNORMAL
ISOLATED FROM: ABNORMAL

## 2018-03-20 PROCEDURE — 25010000002 METHYLPREDNISOLONE PER 40 MG: Performed by: INTERNAL MEDICINE

## 2018-03-20 RX ORDER — PREDNISONE 5 MG/ML
20 SOLUTION ORAL DAILY
Qty: 60 ML | Refills: 0
Start: 2018-03-20 | End: 2018-03-23

## 2018-03-20 RX ORDER — FOLIC ACID 1 MG/1
1 TABLET ORAL DAILY
Start: 2018-03-21

## 2018-03-20 RX ORDER — LEVOTHYROXINE SODIUM 0.15 MG/1
150 TABLET ORAL DAILY
Start: 2018-03-20

## 2018-03-20 RX ADMIN — LISINOPRIL 10 MG: 10 TABLET ORAL at 10:00

## 2018-03-20 RX ADMIN — DEXTROSE AND SODIUM CHLORIDE 100 ML/HR: 5; 450 INJECTION, SOLUTION INTRAVENOUS at 02:12

## 2018-03-20 RX ADMIN — METHYLPREDNISOLONE SODIUM SUCCINATE 20 MG: 40 INJECTION, POWDER, FOR SOLUTION INTRAMUSCULAR; INTRAVENOUS at 00:26

## 2018-03-20 RX ADMIN — LEVOTHYROXINE SODIUM ANHYDROUS 150 MCG: 100 INJECTION, POWDER, LYOPHILIZED, FOR SOLUTION INTRAVENOUS at 06:31

## 2018-03-20 RX ADMIN — FERROUS SULFATE TAB 325 MG (65 MG ELEMENTAL FE) 325 MG: 325 (65 FE) TAB at 12:22

## 2018-03-20 RX ADMIN — FOLIC ACID 1 MG: 1 TABLET ORAL at 10:00

## 2018-03-20 RX ADMIN — VITAMIN B12 0.1 MG ORAL TABLET 100 MCG: 0.1 TABLET ORAL at 10:00

## 2018-03-20 RX ADMIN — FERROUS SULFATE TAB 325 MG (65 MG ELEMENTAL FE) 325 MG: 325 (65 FE) TAB at 10:00

## 2018-03-20 RX ADMIN — METHYLPREDNISOLONE SODIUM SUCCINATE 20 MG: 40 INJECTION, POWDER, FOR SOLUTION INTRAMUSCULAR; INTRAVENOUS at 12:22

## 2018-03-20 NOTE — NURSING NOTE
Attempted to call report on the patient to [facility].  Was told by admissions nurse to call back in 15 minutes to give report.

## 2018-03-20 NOTE — PROGRESS NOTES
Case Management Discharge Note    Case Management Discharge Note    Final Note: NOTIFIED SAFIA AT [facility] OF PT'S D/C TODAY.  PT. WILL BE ADMITTED TO THE SKILLED LEVEL OF CARE AND TRANSPORT VIA [facility] PER SON, NINO DUKE.      Destination     No service coordination in this encounter.      Durable Medical Equipment     No service coordination in this encounter.      Dialysis/Infusion     No service coordination in this encounter.      Home Medical Care     No service coordination in this encounter.      Social Care     No service coordination in this encounter.             Final Discharge Disposition Code: 03 - skilled nursing facility (SNF)    Destination     No service coordination in this encounter.      Durable Medical Equipment     No service coordination in this encounter.      Dialysis/Infusion     No service coordination in this encounter.      Home Medical Care     No service coordination in this encounter.      Social Care     No service coordination in this encounter.             Final Discharge Disposition Code: 03 - skilled nursing facility (SNF)

## 2018-03-20 NOTE — PLAN OF CARE
Problem: Patient Care Overview  Goal: Plan of Care Review  Outcome: Ongoing (interventions implemented as appropriate)   03/20/18 0349   OTHER   Outcome Summary pureed diet with honey thick liquid; COMFORT MSE ONLY; mepilex remains on coccyx area;; taking whole pills with applesauce; oxygen at 3lper nc       Problem: Fall Risk (Adult)  Goal: Absence of Fall  Outcome: Ongoing (interventions implemented as appropriate)   03/20/18 0349   Fall Risk (Adult)   Absence of Fall making progress toward outcome       Problem: Skin Injury Risk (Adult)  Goal: Skin Health and Integrity  Outcome: Ongoing (interventions implemented as appropriate)   03/20/18 0349   Skin Injury Risk (Adult)   Skin Health and Integrity making progress toward outcome

## 2018-03-20 NOTE — DISCHARGE SUMMARY
[facility] Medicine Services  DISCHARGE SUMMARY       Date of Admission: 3/15/2018  Date of Discharge:  3/20/2018  Primary Care Physician: Jorgito Muñoz DO    Discharge Diagnoses:  Hospital Problem List     * (Principal)Metabolic encephalopathy    Acquired hypothyroidism    Overview Signed 9/7/2016 12:16 PM by JUANI Squires     HYPOTHYROIDISM         Vascular dementia without behavioral disturbance    Hypernatremia    Failure to thrive in adult, suspected    Macrocytic anemia    Elevated TSH    Protein-calorie malnutrition, severe    Medical non-compliance   Hypothermia          Presenting Problem/History of Present Illness:  Elevated TSH [R94.6]         Hospital Course   84-year-old -American woman who was found down at home according to nursing feedback at the day of admission.  Patient was unable to give any meaningful information at the time of admission.   Initial diagnostic study showed normal white count, elevated creatinine at 1.93, potassium of 5.6, BUN of 36.  Head CT scan showed small bilateral subdural collection Speck did with chronic subdural hemorrhagic change admitted with provisional diagnosis of metabolic encephalopathy in the background of  dementia.   her TSH was elevated over 50.  Record indicates that she was hypothermic as well.     Dr. Stephens saw the patient in consult regarding an abnormal CT scan.  No acute surgical intervention was recommended her serum sodium was 145 and had gone down to 140.  This has not been rechecked since March 17.  Patient had been set  comfort measure only.  Note that she was restarted on lisinopril for her blood pressure prior to my attendance to her.  I will defer to her primary provider/nursing home physician treatment of hypertension.  I would consider changing to Norvasc given history of hyperkalemia.  During this hospitalization, she received IV levothyroxine.  I am continuing her on higher dose of  levothyroxine (150 µg daily).  I also empirically started her on B12 by mouth and folate acid her macrocytosis with anemia.   She was nothing by mouth when I came in.  I started her on puréed diet with honey thickened liquid.  I will defer to nursing home provider if diet consistency  can be further advanced as she can tolerate it.    The son is at bedside during mg encounter.  The nurse likewise.  He verbalized adequate understanding of her code status.  He understands the concept of comfort measures.  He reiterates DNR/DNI.  I asked the nurse and SW to facilitate the MOST form    Procedures Performed: None     Consults:  Dr. Elroy Stephens       Pertinent Test Results:   Lab Results (last 7 days)     Procedure Component Value Units Date/Time    Blood Culture - Blood, [[id number]]  (Abnormal) Collected:  03/15/18 1202    Specimen:  Blood from Arm, Left Updated:  03/20/18 0707     Blood Culture --      Micrococcus species (A)     Isolated from Pediatric Bottle     Gram Stain Result Gram positive cocci in clusters    Narrative:       Probable Contaminant    Blood Culture - Blood, [[id number]]  (Normal) Collected:  03/15/18 1202    Specimen:  Blood from Arm, Right Updated:  03/19/18 1231     Blood Culture No growth at 4 days    Blood Culture ID, PCR - Blood, [[id number]]  (Normal) Collected:  03/15/18 1202    Specimen:  Blood from Arm, Left Updated:  03/18/18 0722     BCID, PCR No organism detected by BCID PCR.    T4, Free [[id number]]  (Abnormal) Collected:  03/17/18 0917    Specimen:  Blood Updated:  03/17/18 1042     Free T4 2.22 (H) ng/dL     CBC & Differential [[id number]] Collected:  03/17/18 0633    Specimen:  Blood Updated:  03/17/18 0716    Narrative:       The following orders were created for panel order CBC & Differential.  Procedure                               Abnormality         Status                     ---------                               -----------         ------                     Manual  Differential[[id number]]          Abnormal            Final result               CBC Auto Differential[[id number]]        Abnormal            Final result                 Please view results for these tests on the individual orders.    CBC Auto Differential [[id number]]  (Abnormal) Collected:  03/17/18 0633    Specimen:  Blood Updated:  03/17/18 0716     WBC 10.51 10*3/mm3      RBC 2.68 (L) 10*6/mm3      Hemoglobin 9.0 (L) g/dL      Hematocrit 27.0 (L) %      .7 (H) fL      MCH 33.6 (H) pg      MCHC 33.3 g/dL      RDW 14.0 %      RDW-SD 51.2 fl      MPV 10.2 fL      Platelets 203 10*3/mm3      nRBC 0.3 (H) /100 WBC     Manual Differential [[id number]]  (Abnormal) Collected:  03/17/18 0633    Specimen:  Blood Updated:  03/17/18 0716     Neutrophil % 76.0 %      Lymphocyte % 2.0 (L) %      Bands %  22.0 (H) %      Neutrophils Absolute 10.30 (H) 10*3/mm3      Lymphocytes Absolute 0.21 (L) 10*3/mm3      RBC Morphology Normal     WBC Morphology Normal     Platelet Morphology Normal    Comprehensive Metabolic Panel [[id number]]  (Abnormal) Collected:  03/17/18 0633    Specimen:  Blood Updated:  03/17/18 0710     Glucose 217 (H) mg/dL      BUN 27 (H) mg/dL      Creatinine 1.43 (H) mg/dL      Sodium 145 mmol/L      Potassium 3.4 (L) mmol/L      Chloride 113 (H) mmol/L      CO2 25.0 mmol/L      Calcium 7.8 (L) mg/dL      Total Protein 7.3 g/dL      Albumin 2.90 (L) g/dL      ALT (SGPT) 28 U/L      AST (SGOT) 45 U/L      Alkaline Phosphatase 58 U/L      Total Bilirubin 0.3 mg/dL      eGFR  African Amer 42 (L) mL/min/1.73      Globulin 4.4 gm/dL      A/G Ratio 0.7 (L) g/dL      BUN/Creatinine Ratio 18.9     Anion Gap 7.0 mmol/L     Narrative:       The MDRD GFR formula is only valid for adults with stable renal function between ages 18 and 70.    Cortisol [[id number]] Collected:  03/16/18 0443    Specimen:  Blood Updated:  03/17/18 0613     Cortisol 53.2 ug/dL      Comment:                         Cortisol AM         6.2 -  19.4                          Cortisol PM         2.3 - 11.9       Narrative:       Performed at:  [address] [place] [address]  [id number]  : Yoseph Perez PhD, Phone:  [phone]    POC Glucose Once [[id number]]  (Abnormal) Collected:  03/16/18 1730    Specimen:  Blood Updated:  03/16/18 1752     Glucose 224 (H) mg/dL      Comment: : [id number] Girma Wolfe ID: [id number]       Prealbumin [[id number]]  (Abnormal) Collected:  03/16/18 0443    Specimen:  Blood Updated:  03/16/18 1057     Prealbumin 6.3 (L) mg/dL     Comprehensive Metabolic Panel [[id number]]  (Abnormal) Collected:  03/16/18 0443    Specimen:  Blood Updated:  03/16/18 0541     Glucose 219 (H) mg/dL      BUN 34 (H) mg/dL      Creatinine 1.82 (H) mg/dL      Sodium 150 (H) mmol/L      Potassium 4.1 mmol/L      Chloride 114 (H) mmol/L      CO2 22.0 (L) mmol/L      Calcium 8.5 mg/dL      Total Protein 8.6 g/dL      Albumin 3.50 g/dL      ALT (SGPT) 31 U/L      AST (SGOT) 57 (H) U/L      Alkaline Phosphatase 69 U/L      Total Bilirubin 0.5 mg/dL      eGFR   Amer 32 (L) mL/min/1.73      Globulin 5.1 gm/dL      A/G Ratio 0.7 (L) g/dL      BUN/Creatinine Ratio 18.7     Anion Gap 14.0 (H) mmol/L     Narrative:       The MDRD GFR formula is only valid for adults with stable renal function between ages 18 and 70.    CK [[id number]]  (Abnormal) Collected:  03/16/18 0443    Specimen:  Blood Updated:  03/16/18 0541     Creatine Kinase 693 (H) U/L     CBC Auto Differential [[id number]]  (Abnormal) Collected:  03/16/18 0443    Specimen:  Blood Updated:  03/16/18 0529     WBC 7.04 10*3/mm3      RBC 3.27 (L) 10*6/mm3      Hemoglobin 11.0 (L) g/dL      Hematocrit 33.2 (L) %      .5 (H) fL      MCH 33.6 (H) pg      MCHC 33.1 g/dL      RDW 13.8 %      RDW-SD 51.7 fl      MPV 10.4 fL      Platelets 245 10*3/mm3      Neutrophil % 87.5 (H) %      Lymphocyte % 8.7 (L) %      Monocyte % 2.3 (L) %      Eosinophil % 0.0 %       Basophil % 0.1 %      Immature Grans % 1.4 %      Neutrophils, Absolute 6.16 10*3/mm3      Lymphocytes, Absolute 0.61 (L) 10*3/mm3      Monocytes, Absolute 0.16 (L) 10*3/mm3      Eosinophils, Absolute 0.00 10*3/mm3      Basophils, Absolute 0.01 10*3/mm3      Immature Grans, Absolute 0.10 (H) 10*3/mm3      nRBC 0.0 /100 WBC     POC Glucose Once [[id number]]  (Abnormal) Collected:  03/16/18 0114    Specimen:  Blood Updated:  03/16/18 0125     Glucose 208 (H) mg/dL      Comment: : [id number] Nettles RihanaMeter ID: [id number]       POC Glucose Once [[id number]]  (Normal) Collected:  03/15/18 2015    Specimen:  Blood Updated:  03/15/18 2036     Glucose 71 mg/dL      Comment: : [id number] Elroy Mirzaeter ID: [id number]       Basic Metabolic Panel [[id number]]  (Abnormal) Collected:  03/15/18 1836    Specimen:  Blood Updated:  03/15/18 1906     Glucose 56 (L) mg/dL      BUN 36 (H) mg/dL      Creatinine 2.07 (H) mg/dL      Sodium 146 (H) mmol/L      Potassium 4.4 mmol/L      Chloride 112 (H) mmol/L      CO2 25.0 mmol/L      Calcium 8.7 mg/dL      eGFR  African Amer 28 (L) mL/min/1.73      BUN/Creatinine Ratio 17.4     Anion Gap 9.0 mmol/L     Narrative:       The MDRD GFR formula is only valid for adults with stable renal function between ages 18 and 70.    T4, Free [[id number]]  (Abnormal) Collected:  03/15/18 1202    Specimen:  Blood Updated:  03/15/18 1746     Free T4 <0.07 (L) ng/dL     T3, Free [[id number]]  (Abnormal) Collected:  03/15/18 1202    Specimen:  Blood Updated:  03/15/18 1746     T3, Free 0.78 (L) pg/mL     Lactic Acid, Reflex [[id number]]  (Abnormal) Collected:  03/15/18 1617    Specimen:  Blood Updated:  03/15/18 1646     Lactate 2.4 (C) mmol/L     Lactic Acid, Reflex Timer (This will reflex a repeat order 3-3:15 hours after ordered.) [[id number]] Collected:  03/15/18 1202    Specimen:  Blood Updated:  03/15/18 1601     Extra Tube Hold for add-ons.     Comment: Auto resulted.       CK [[id number]]   (Abnormal) Collected:  03/15/18 1202    Specimen:  Blood Updated:  03/15/18 1347     Creatine Kinase 1,077 (H) U/L     TSH [[id number]]  (Abnormal) Collected:  03/15/18 1202    Specimen:  Blood Updated:  03/15/18 1307     TSH 52.900 (H) mIU/mL     Comprehensive Metabolic Panel [[id number]]  (Abnormal) Collected:  03/15/18 1202    Specimen:  Blood Updated:  03/15/18 1249     Glucose 109 (H) mg/dL      BUN 36 (H) mg/dL      Comment: Specimen hemolyzed. Results may be affected.        Creatinine 1.93 (H) mg/dL      Sodium 145 mmol/L      Potassium 5.6 (H) mmol/L      Comment: Specimen hemolyzed.  Results may be affected.        Chloride 108 mmol/L      CO2 19.0 (L) mmol/L      Calcium 9.7 mg/dL      Total Protein 10.4 (H) g/dL      Albumin 4.20 g/dL      ALT (SGPT) 34 U/L      Comment: Specimen hemolyzed.  Results may be affected.        AST (SGOT) 74 (H) U/L      Comment: Specimen hemolyzed.  Results may be affected.        Alkaline Phosphatase 96 U/L      Comment: Specimen hemolyzed. Results may be affected.        Total Bilirubin 1.0 mg/dL      eGFR  African Amer 30 (L) mL/min/1.73      Globulin 6.2 gm/dL      A/G Ratio 0.7 (L) g/dL      BUN/Creatinine Ratio 18.7     Anion Gap 18.0 (H) mmol/L     Narrative:       The MDRD GFR formula is only valid for adults with stable renal function between ages 18 and 70.    Lactic Acid, Plasma [[id number]]  (Abnormal) Collected:  03/15/18 1202    Specimen:  Blood Updated:  03/15/18 1249     Lactate 2.9 (C) mmol/L     Troponin [[id number]]  (Normal) Collected:  03/15/18 1202    Specimen:  Blood Updated:  03/15/18 1248     Troponin I 0.016 ng/mL     Lipase [[id number]]  (Normal) Collected:  03/15/18 1202    Specimen:  Blood Updated:  03/15/18 1236     Lipase 148 U/L     Urinalysis With / Culture If Indicated - Urine, Catheter [[id number]]  (Abnormal) Collected:  03/15/18 1218    Specimen:  Urine from Urine, Catheter Updated:  03/15/18 1230     Color, UA Yellow     Appearance, UA Clear      pH, UA <=5.0     Specific Gravity, UA 1.017     Glucose, UA Negative     Ketones, UA Trace (A)     Bilirubin, UA Negative     Blood, UA Moderate (2+) (A)     Protein,  mg/dL (2+) (A)     Leuk Esterase, UA Negative     Nitrite, UA Negative     Urobilinogen, UA 0.2 E.U./dL    Urinalysis, Microscopic Only - Urine, Clean Catch [[id number]]  (Abnormal) Collected:  03/15/18 1218    Specimen:  Urine from Urine, Catheter Updated:  03/15/18 1230     RBC, UA 0-2 (A) /HPF      WBC, UA 0-2 (A) /HPF      Bacteria, UA None Seen /HPF      Squamous Epithelial Cells, UA None Seen /HPF      Hyaline Casts, UA 0-2 /LPF      Methodology Automated Microscopy    Protime-INR [[id number]]  (Abnormal) Collected:  03/15/18 1202    Specimen:  Blood Updated:  03/15/18 1228     Protime 12.3 Seconds      INR 0.89 (L)    CBC & Differential [[id number]] Collected:  03/15/18 1202    Specimen:  Blood Updated:  03/15/18 1219    Narrative:       The following orders were created for panel order CBC & Differential.  Procedure                               Abnormality         Status                     ---------                               -----------         ------                     CBC Auto Differential[[id number]]        Abnormal            Final result                 Please view results for these tests on the individual orders.    CBC Auto Differential [[id number]]  (Abnormal) Collected:  03/15/18 1202    Specimen:  Blood Updated:  03/15/18 1219     WBC 7.46 10*3/mm3      RBC 3.55 (L) 10*6/mm3      Hemoglobin 12.0 g/dL      Hematocrit 36.8 (L) %      .7 (H) fL      MCH 33.8 (H) pg      MCHC 32.6 (L) g/dL      RDW 14.0 %      RDW-SD 53.3 fl      MPV 10.0 fL      Platelets 261 10*3/mm3      Neutrophil % 80.4 (H) %      Lymphocyte % 13.0 (L) %      Monocyte % 4.6 %      Eosinophil % 0.0 %      Basophil % 0.3 %      Immature Grans % 1.7 %      Neutrophils, Absolute 6.00 10*3/mm3      Lymphocytes, Absolute 0.97 10*3/mm3      Monocytes,  Absolute 0.34 10*3/mm3      Eosinophils, Absolute 0.00 10*3/mm3      Basophils, Absolute 0.02 10*3/mm3      Immature Grans, Absolute 0.13 (H) 10*3/mm3      nRBC 0.3 (H) /100 WBC     Blood Gas, Arterial [[id number]]  (Abnormal) Collected:  03/15/18 1155    Specimen:  Arterial Blood Updated:  03/15/18 1200     Site Right Radial     Garcia's Test Positive     pH, Arterial 7.392 pH units      pCO2, Arterial 34.3 (L) mm Hg      pO2, Arterial 57.4 (L) mm Hg      HCO3, Arterial 20.9 mmol/L      Base Excess, Arterial -3.5 (L) mmol/L      O2 Saturation, Arterial 88.0 (L) %      Temperature 37.0 C      Barometric Pressure for Blood Gas 750 mmHg      Modality Room Air     Ventilator Mode NA     Collected by [id number]        Imaging Results (last 7 days)     Procedure Component Value Units Date/Time    XR Chest 1 View [[id number]] Collected:  03/17/18 1023     Updated:  03/17/18 1029    Narrative:       EXAMINATION: XR CHEST 1 VW-. 3/17/2018 10:23 AM CDT     CHEST, ONE VIEW:     HISTORY: Hypoxia     COMPARISON: 3/15/2018, 1/11/2015 and 12/9/2014     A single frontal chest radiograph was obtained.     FINDINGS:     COPD and chronic lung changes observed. In the perihilar lower lobe  regions there is increasing interstitial mild  Prominence with mild patchy airspace opacities in the right lower lobe.  Superimposed acute infectious/inflammatory changes considered.     The heart is likely normal in size without heart failure.     The bony structures are intact.                                  Impression:       1. COPD and chronic changes.  2. Increasing perihilar interstitial prominence with patchy airspace  opacities right lower lobe. Acute infectious/inflammatory process  considered.     This report was finalized on 03/17/2018 10:26 by Dr. Trevor Amaya MD.    MRI Brain Without Contrast [[id number]] Collected:  03/16/18 1017     Updated:  03/16/18 1025    Narrative:       EXAMINATION: MRI BRAIN WO CONTRAST- 3/16/2018 10:17 AM CDT      HISTORY: Stroke; R94.6-Abnormal results of thyroid function studies;  R13.12-Dysphagia, oropharyngeal phase; R41.82-Altered mental status,  unspecified; T68.XXXA-Hypothermia, initial encounter;  M62.82-Rhabdomyolysis; N17.9-Acute kidney failure, unspecified;  S06.1L3U-Ydtqpyefa subdural hemorrhage with loss of consciousness of  unspecified duration, initial encounter;     REPORT: Multiplanar multisequence MR imaging of the brain was performed  without contrast, comparison is made with CT of the head without  contrast 3/15/2018.     Moderate motion artifact is present. There is no diffusion restriction.  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 Gradient echo images show no evidence of acute  intracranial hemorrhage. The ventricles and basal cisterns are within  normal limits. There is no midline shift. Intracranial vascular flow  voids are unremarkable. FLAIR images show moderate increased signal  within the periventricular right matter tracts, greatest in the frontal  lobes. This is compatible advanced chronic small vessel white matter  ischemic disease. Volume loss in the right frontal lobe more focally is  also noted which is compatible with chronic infarct with  encephalomalacia.       Impression:       1. Motion artifact degrades the study, no evidence of acute ischemia is  identified. There is advanced atrophy, chronic appearing bifrontal and  biparietal subdural hygromas are ill-defined. There is no midline shift.  There are advanced chronic small vessel white matter ischemic changes  particularly in the frontal white matter tracts. A chronic infarct is  noted in the right frontal lobe.  This report was finalized on 03/16/2018 10:22 by Dr. James Solorzano MD.    CT Cervical Spine Without Contrast [[id number]] Collected:  03/16/18 0943     Updated:  03/16/18 0947    Narrative:        CT CERVICAL SPINE WO CONTRAST- 3/16/2018 9:23 AM CDT     HISTORY: fall; R94.6-Abnormal results of thyroid function studies;  R13.12-Dysphagia, oropharyngeal phase; R41.82-Altered mental status,  unspecified; T68.XXXA-Hypothermia, initial encounter;  M62.82-Rhabdomyolysis; N17.9-Acute kidney failure, unspecified;  S06.9M3S-Pyddvwklt subdural hemorrhage with loss of consciousness of  unspecified duration, initial encounter; R91.8-Other nonspecific  abnormal finding of      COMPARISON: None      DOSE LENGTH PRODUCT: 528 mGy cm. Automated exposure control was also  utilized to decrease patient radiation dose.     TECHNIQUE: Serial helical tomographic images of the cervical spine were  obtained without the use of intravenous contrast. Additionally, sagittal  and coronal reformatted images were also provided for review.      FINDINGS:   Images are moderately limited by patient motion. Multiple scans were  attempted.     Multilevel degenerative changes are seen in the cervical spine. There is  no evidence of acute fracture or subluxation. The prevertebral soft  tissues are within normal limits. The posterior elements are intact.  Vertebral body heights are maintained.     The visualized skull base is intact. The visualized thorax demonstrates  no acute abnormality.       Impression:       1. Degenerative changes are noted throughout the spine.     Within limits of this study that is limited by motion, there are no  acute fractures or subluxations. Small fractures could be overlooked due  to motion artifact.  This report was finalized on 03/16/2018 09:44 by Dr. Montrell Andrews MD.    XR Spine Lumbar 2 or 3 View [[id number]] Collected:  03/15/18 2111     Updated:  03/15/18 2115    Narrative:       EXAMINATION: XR SPINE LUMBAR 2 OR 3 VW-     3/15/2018 8:53 PM CDT     HISTORY: fall; R94.6-Abnormal results of thyroid function studies;  R13.12-Dysphagia, oropharyngeal phase; R41.82-Altered mental status,  unspecified;  T68.XXXA-Hypothermia, initial encounter;  M62.82-Rhabdomyolysis; N17.9-Acute kidney failure, unspecified;  S06.9T1G-Ceytjnbiy subdural hemorrhage with loss of consciousness of  unspecified duration, initial encounter; R91.8-Other nonspecific  abnormal finding of .     Lumbar spine series, 2 views.     Diffuse osteopenia.     Symmetric SI joints.     Lordotic curvature is appropriate.  Mild endplate spurring.  No compression fracture.     Summary:  1. No acute bony abnormality.  This report was finalized on 03/15/2018 21:12 by Dr. Saurabh Black MD.    XR Spine Thoracic 2 View [[id number]] Collected:  03/15/18 2110     Updated:  03/15/18 2114    Narrative:       EXAMINATION: XR SPINE THORACIC 2 VW-     3/15/2018 8:50 PM CDT     HISTORY: fall; R94.6-Abnormal results of thyroid function studies;  R13.12-Dysphagia, oropharyngeal phase; R41.82-Altered mental status,  unspecified; T68.XXXA-Hypothermia, initial encounter;  M62.82-Rhabdomyolysis; N17.9-Acute kidney failure, unspecified;  S06.7A0K-Mapoqyhrf subdural hemorrhage with loss of consciousness of  unspecified duration, initial encounter; R91.8-Other nonspecific  abnormal finding of .     Three-view thoracic spine series.     Prominent disc space narrowing and endplate spurring throughout the mid  cervical spine.     No significant thoracic scoliosis.  Diffuse osteopenia.     Thoracic lordosis is appropriate.     Mild disc space narrowing and endplate spurring.     No acute fracture is seen.     Summary:  1. No evidence of thoracic spine fracture.  This report was finalized on 03/15/2018 21:11 by Dr. Saurabh Black MD.    XR Chest 1 View [[id number]] Collected:  03/15/18 1343     Updated:  03/15/18 1351    Narrative:       EXAMINATION: XR CHEST 1 VW- 3/15/2018 1:43 PM CDT     HISTORY: AMS.     REPORT: Comparison is made with the study from 1/11/2015.     The lungs are hyperinflated, no infiltrate is identified. There is a  small nodular density at the left base which is  probably a nipple  shadow. There is a small masslike density in the medial right lung base  that measures approximately 2 cm. Heart size is normal. No pneumothorax  or pleural effusion is identified. The osseous structures and upper  abdomen appear unremarkable.       Impression:       1. 2 cm masslike density in the medial right lung base, CT of chest  without contrast is recommended for follow-up to exclude neoplasm. A  small nodular density at the left lung base may represent a nipple  shadow. This can be evaluated on CT as well. COPD.     REPORT was flagged for attention to the emergency department.  This report was finalized on 03/15/2018 13:48 by Dr. James Solorzano MD.    CT Head Without Contrast [[id number]] Collected:  03/15/18 1306     Updated:  03/15/18 1317    Narrative:       EXAMINATION: CT HEAD WO CONTRAST-  3/15/2018 1:06 PM CDT     CT SCAN OF THE HEAD, WITHOUT CONTRAST:      HISTORY: Confusion/delirium, altered level of consciousness, unexplained     COMPARISONS: 1/11/2015 head CT      TECHNIQUE:     Radiation dose equals DLP 1115 mGy-cm.  Automated exposure control dose  reduction technique was implemented.        CT evaluation of the head without intravenous contrast. 5 mm transaxial  images were obtained.   2-D sagittal and coronal reconstruction images  were generated.     FINDINGS: Image quality degradation related to streak-like motion  artifact appreciated.     There is diffuse central and cortical atrophy.     There is low-attenuation in the periventricular and subcortical white  matter compatible with chronic ischemic changes.     There are small bilateral subdural collections, slightly greater  attenuation than CSF. Suspect chronic subdural hemorrhagic changes. The  subdural collection along the right convexity is slightly greater  measuring nearly 5 mm in thickness. The left subdural collection  measures approximately 3.5 mm in thickness along the convexity.     There is no intraaxial  "hemorrhage. There is no acute extra-axial  hemorrhage.     There is no mass effect or midline shift. There is no hydrocephalus.     Bone window imaging reveals no calvarial abnormality.     Paranasal sinuses imaged in part are clear.          Impression:       1. Small bilateral subdural collections, attenuation slightly greater  than CSF, suspect chronic subdural hemorrhagic change  2. No acute hemorrhage..  3. Atrophy and chronic ischemic white matter change.              This report was finalized on 03/15/2018 13:14 by Dr. Trevor Amaya MD.            Condition on Discharge:  fair    Physical Exam on Discharge:  /52 (BP Location: Right arm, Patient Position: Lying)   Pulse 76   Temp 97.4 °F (36.3 °C) (Oral)   Resp 16   Ht 157.5 cm (62\")   Wt 34.6 kg (76 lb 3.2 oz)   SpO2 96%   BMI 13.94 kg/m²   Physical Exam   Greets me back verbally and with a smile  No apparent distress, answers appropriately, alert and oriented to self  Atrophic muscles  Head: Normocephalic and atraumatic.   Eyes: Conjunctivae and EOM are normal. Pupils are equal, round, and reactive to light. No scleral icterus.   Neck: Neck supple. No JVD present.   Cardiovascular: Normal rate and regular rhythm.  Exam reveals no gallop and no friction rub.    No murmur heard.  Pulmonary/Chest: Effort normal and breath sounds normal.   Abdominal: Soft. Bowel sounds are normal. There is no hepatosplenomegaly. There is no tenderness.   Musculoskeletal: Normal range of motion. She exhibits no edema.   Neurological: She is alert. No cranial nerve deficit.    answers questions appropriately and follows commands   Skin: Skin is warm and dry.   Psychiatric: She has a normal mood and affect.   Nursing note and vitals reviewed.   son is at bedside    Discharge Disposition:  Skilled Nursing Facility (DC - External)    Discharge Medications:   Saúl Santiago S   Home Medication Instructions CALVIN:[id number]    Printed on:03/20/18 1011   Medication " Information                      cyancobalamin 100 MCG tablet  Take 1 tablet by mouth Daily.             ferrous sulfate 325 (65 FE) MG tablet  Take 1 tablet by mouth 3 (Three) Times a Day With Meals.             folic acid (FOLVITE) 1 MG tablet  Take 1 tablet by mouth Daily.             levothyroxine (SYNTHROID, LEVOTHROID) 150 MCG tablet  Take 1 tablet by mouth Daily.             lisinopril (PRINIVIL,ZESTRIL) 10 MG tablet  Take 10 mg by mouth 2 (Two) Times a Day.             predniSONE 5 MG/5ML solution  Take 20 mL by mouth Daily for 3 days.                 Discharge Diet:   Diet Instructions     Diet: Dysphagia; Honey Thick Liquids; Pureed       Discharge Diet:  Dysphagia    Fluid Consistency:  Honey Thick Liquids    Pureed Options:  Pureed   I recommend assistance in feeding and exercise aspiration precaution            Activity at Discharge:   Activity Instructions     Activity as Tolerated             Follow-up Appointments: NHP   Test Results Pending at Discharge:    Order Current Status    Blood Culture - Blood, Preliminary result           Ren Bland MD  03/20/18  10:11 AM    Time: > 30 mins    Please note that portions of this note may have been completed with a voice recognition program. Efforts were made to edit the dictations, but occasionally words are mistranscribed.

## 2018-03-20 NOTE — NURSING NOTE
"2nd attempt calling report on the patient to [facility].  Spoke with nurse, Rhiannon.  Was told \"she needed about 10 more minutes\" and will call me back.  Left my name and number to 3C.  "

## 2018-03-28 ENCOUNTER — PATIENT OUTREACH (OUTPATIENT)
Dept: CASE MANAGEMENT | Facility: OTHER | Age: 83
End: 2018-03-28

## 2018-03-29 NOTE — OUTREACH NOTE
Skilled Nursing Facility Discharge Flowsheet:     Skilled Nursing Facility Discharge Assessment 3/28/2018   Acute Facility Discharged From [place]   Acute Discharge Date 3/20/2018   Name of the Skilled Nursing Facility? [facility]   Tier Level of the Skilled Nursing Facility 2   Purpose of SNF Admission PT;OT   Estimated length of stay for the patient? Unclear at present   Who is the insurance provider or payor of patient stay? Medicare   Progression of Patient? Therapy in progress

## 2018-04-03 ENCOUNTER — PATIENT OUTREACH (OUTPATIENT)
Dept: CASE MANAGEMENT | Facility: OTHER | Age: 83
End: 2018-04-03

## 2018-04-03 ENCOUNTER — LAB REQUISITION (OUTPATIENT)
Dept: LAB | Facility: HOSPITAL | Age: 83
End: 2018-04-03

## 2018-04-03 ENCOUNTER — HOSPITAL ENCOUNTER (EMERGENCY)
Facility: HOSPITAL | Age: 83
Discharge: HOME | End: 2018-04-03
Attending: EMERGENCY MEDICINE | Admitting: EMERGENCY MEDICINE

## 2018-04-03 VITALS
DIASTOLIC BLOOD PRESSURE: 40 MMHG | OXYGEN SATURATION: 92 % | BODY MASS INDEX: 18.46 KG/M2 | HEART RATE: 54 BPM | RESPIRATION RATE: 17 BRPM | TEMPERATURE: 97.7 F | WEIGHT: 94 LBS | HEIGHT: 60 IN | SYSTOLIC BLOOD PRESSURE: 184 MMHG

## 2018-04-03 DIAGNOSIS — D64.9 ANEMIA, UNSPECIFIED TYPE: Primary | ICD-10-CM

## 2018-04-03 DIAGNOSIS — Z00.00 ENCOUNTER FOR GENERAL ADULT MEDICAL EXAMINATION WITHOUT ABNORMAL FINDINGS: ICD-10-CM

## 2018-04-03 LAB
25(OH)D3 SERPL-MCNC: <12.8 NG/ML (ref 30–100)
ABO GROUP BLD: NORMAL
ALBUMIN SERPL-MCNC: 2.1 G/DL (ref 3.5–5)
ALBUMIN SERPL-MCNC: 2.1 G/DL (ref 3.5–5)
ALBUMIN/GLOB SERPL: 0.5 G/DL (ref 1.1–2.5)
ALP SERPL-CCNC: 63 U/L (ref 24–120)
ALP SERPL-CCNC: 64 U/L (ref 24–120)
ALT SERPL W P-5'-P-CCNC: 20 U/L (ref 0–54)
ALT SERPL W P-5'-P-CCNC: 24 U/L (ref 0–54)
ANION GAP SERPL CALCULATED.3IONS-SCNC: 2 MMOL/L (ref 4–13)
ANION GAP SERPL CALCULATED.3IONS-SCNC: 2 MMOL/L (ref 4–13)
ARTICHOKE IGE QN: 38 MG/DL (ref 0–99)
AST SERPL-CCNC: 12 U/L (ref 7–45)
AST SERPL-CCNC: 13 U/L (ref 7–45)
BASOPHILS # BLD AUTO: 0 10*3/MM3 (ref 0–0.2)
BASOPHILS NFR BLD AUTO: 0 % (ref 0–2)
BILIRUB CONJ SERPL-MCNC: 0 MG/DL (ref 0–0.3)
BILIRUB INDIRECT SERPL-MCNC: 0.1 MG/DL (ref 0–1.1)
BILIRUB SERPL-MCNC: 0.2 MG/DL (ref 0.1–1)
BILIRUB SERPL-MCNC: 0.3 MG/DL (ref 0.1–1)
BLD GP AB SCN SERPL QL: NEGATIVE
BUN BLD-MCNC: 20 MG/DL (ref 5–21)
BUN BLD-MCNC: 20 MG/DL (ref 5–21)
BUN/CREAT SERPL: 20.8 (ref 7–25)
BUN/CREAT SERPL: 21.1 (ref 7–25)
CALCIUM SPEC-SCNC: 7.8 MG/DL (ref 8.4–10.4)
CALCIUM SPEC-SCNC: 7.8 MG/DL (ref 8.4–10.4)
CHLORIDE SERPL-SCNC: 107 MMOL/L (ref 98–110)
CHLORIDE SERPL-SCNC: 108 MMOL/L (ref 98–110)
CHOLEST SERPL-MCNC: 104 MG/DL (ref 130–200)
CO2 SERPL-SCNC: 32 MMOL/L (ref 24–31)
CO2 SERPL-SCNC: 35 MMOL/L (ref 24–31)
CREAT BLD-MCNC: 0.95 MG/DL (ref 0.5–1.4)
CREAT BLD-MCNC: 0.96 MG/DL (ref 0.5–1.4)
DEPRECATED RDW RBC AUTO: 53.6 FL (ref 40–54)
DEPRECATED RDW RBC AUTO: 55.1 FL (ref 40–54)
EOSINOPHIL # BLD AUTO: 0.01 10*3/MM3 (ref 0–0.7)
EOSINOPHIL NFR BLD AUTO: 0.2 % (ref 0–4)
ERYTHROCYTE [DISTWIDTH] IN BLOOD BY AUTOMATED COUNT: 14.2 % (ref 12–15)
ERYTHROCYTE [DISTWIDTH] IN BLOOD BY AUTOMATED COUNT: 14.5 % (ref 12–15)
GFR SERPL CREATININE-BSD FRML MDRD: 67 ML/MIN/1.73
GFR SERPL CREATININE-BSD FRML MDRD: 68 ML/MIN/1.73
GLOBULIN UR ELPH-MCNC: 3.9 GM/DL
GLUCOSE BLD-MCNC: 70 MG/DL (ref 70–100)
GLUCOSE BLD-MCNC: 77 MG/DL (ref 70–100)
HBA1C MFR BLD: 6.1 %
HCT VFR BLD AUTO: 20.9 % (ref 37–47)
HCT VFR BLD AUTO: 23.8 % (ref 37–47)
HDLC SERPL-MCNC: 29 MG/DL
HGB BLD-MCNC: 6.8 G/DL (ref 12–16)
HGB BLD-MCNC: 7.4 G/DL (ref 12–16)
IMM GRANULOCYTES # BLD: 0.02 10*3/MM3 (ref 0–0.03)
IMM GRANULOCYTES NFR BLD: 0.4 % (ref 0–5)
LDLC/HDLC SERPL: 2.01 {RATIO}
LYMPHOCYTES # BLD AUTO: 1.55 10*3/MM3 (ref 0.72–4.86)
LYMPHOCYTES NFR BLD AUTO: 32.6 % (ref 15–45)
MCH RBC QN AUTO: 32.7 PG (ref 28–32)
MCH RBC QN AUTO: 33.7 PG (ref 28–32)
MCHC RBC AUTO-ENTMCNC: 31.1 G/DL (ref 33–36)
MCHC RBC AUTO-ENTMCNC: 32.5 G/DL (ref 33–36)
MCV RBC AUTO: 103.5 FL (ref 82–98)
MCV RBC AUTO: 105.3 FL (ref 82–98)
MONOCYTES # BLD AUTO: 0.32 10*3/MM3 (ref 0.19–1.3)
MONOCYTES NFR BLD AUTO: 6.7 % (ref 4–12)
NEUTROPHILS # BLD AUTO: 2.86 10*3/MM3 (ref 1.87–8.4)
NEUTROPHILS NFR BLD AUTO: 60.1 % (ref 39–78)
NRBC BLD MANUAL-RTO: 0 /100 WBC (ref 0–0)
PLATELET # BLD AUTO: 117 10*3/MM3 (ref 130–400)
PLATELET # BLD AUTO: 150 10*3/MM3 (ref 130–400)
PMV BLD AUTO: 10.9 FL (ref 6–12)
PMV BLD AUTO: 11 FL (ref 6–12)
POTASSIUM BLD-SCNC: 4.3 MMOL/L (ref 3.5–5.3)
POTASSIUM BLD-SCNC: 4.3 MMOL/L (ref 3.5–5.3)
PROT SERPL-MCNC: 5.9 G/DL (ref 6.3–8.7)
PROT SERPL-MCNC: 6 G/DL (ref 6.3–8.7)
RBC # BLD AUTO: 2.02 10*6/MM3 (ref 4.2–5.4)
RBC # BLD AUTO: 2.26 10*6/MM3 (ref 4.2–5.4)
RH BLD: POSITIVE
SODIUM BLD-SCNC: 142 MMOL/L (ref 135–145)
SODIUM BLD-SCNC: 144 MMOL/L (ref 135–145)
T&S EXPIRATION DATE: NORMAL
T4 FREE SERPL-MCNC: 1.35 NG/DL (ref 0.78–2.19)
TRIGL SERPL-MCNC: 83 MG/DL (ref 0–149)
TSH SERPL DL<=0.05 MIU/L-ACNC: 22 MIU/ML (ref 0.47–4.68)
VIT B12 BLD-MCNC: >1000 PG/ML (ref 239–931)
WBC NRBC COR # BLD: 4.64 10*3/MM3 (ref 4.8–10.8)
WBC NRBC COR # BLD: 4.76 10*3/MM3 (ref 4.8–10.8)

## 2018-04-03 PROCEDURE — 82607 VITAMIN B-12: CPT | Performed by: INTERNAL MEDICINE

## 2018-04-03 PROCEDURE — 85027 COMPLETE CBC AUTOMATED: CPT | Performed by: INTERNAL MEDICINE

## 2018-04-03 PROCEDURE — 85025 COMPLETE CBC W/AUTO DIFF WBC: CPT | Performed by: EMERGENCY MEDICINE

## 2018-04-03 PROCEDURE — 80076 HEPATIC FUNCTION PANEL: CPT | Performed by: INTERNAL MEDICINE

## 2018-04-03 PROCEDURE — 84443 ASSAY THYROID STIM HORMONE: CPT | Performed by: INTERNAL MEDICINE

## 2018-04-03 PROCEDURE — 83036 HEMOGLOBIN GLYCOSYLATED A1C: CPT | Performed by: INTERNAL MEDICINE

## 2018-04-03 PROCEDURE — 36430 TRANSFUSION BLD/BLD COMPNT: CPT

## 2018-04-03 PROCEDURE — 84439 ASSAY OF FREE THYROXINE: CPT | Performed by: INTERNAL MEDICINE

## 2018-04-03 PROCEDURE — 36415 COLL VENOUS BLD VENIPUNCTURE: CPT | Performed by: INTERNAL MEDICINE

## 2018-04-03 PROCEDURE — 86850 RBC ANTIBODY SCREEN: CPT | Performed by: EMERGENCY MEDICINE

## 2018-04-03 PROCEDURE — 80061 LIPID PANEL: CPT | Performed by: INTERNAL MEDICINE

## 2018-04-03 PROCEDURE — 82306 VITAMIN D 25 HYDROXY: CPT | Performed by: INTERNAL MEDICINE

## 2018-04-03 PROCEDURE — 86901 BLOOD TYPING SEROLOGIC RH(D): CPT | Performed by: EMERGENCY MEDICINE

## 2018-04-03 PROCEDURE — 80053 COMPREHEN METABOLIC PANEL: CPT | Performed by: EMERGENCY MEDICINE

## 2018-04-03 PROCEDURE — 80048 BASIC METABOLIC PNL TOTAL CA: CPT | Performed by: INTERNAL MEDICINE

## 2018-04-03 PROCEDURE — 86920 COMPATIBILITY TEST SPIN: CPT

## 2018-04-03 PROCEDURE — 36415 COLL VENOUS BLD VENIPUNCTURE: CPT | Performed by: EMERGENCY MEDICINE

## 2018-04-03 PROCEDURE — 86900 BLOOD TYPING SEROLOGIC ABO: CPT | Performed by: EMERGENCY MEDICINE

## 2018-04-03 PROCEDURE — 99285 EMERGENCY DEPT VISIT HI MDM: CPT

## 2018-04-03 PROCEDURE — P9016 RBC LEUKOCYTES REDUCED: HCPCS

## 2018-04-03 PROCEDURE — 86900 BLOOD TYPING SEROLOGIC ABO: CPT

## 2018-04-03 RX ORDER — LISINOPRIL 10 MG/1
10 TABLET ORAL ONCE
Status: COMPLETED | OUTPATIENT
Start: 2018-04-03 | End: 2018-04-03

## 2018-04-03 RX ADMIN — LISINOPRIL 10 MG: 10 TABLET ORAL at 20:39

## 2018-04-03 NOTE — ED PROVIDER NOTES
Subjective   Patient had labs drawn at NH and reported Hgb of 6.8.  H/o anemia in past.        History provided by:  Nursing home  History limited by:  Dementia   used: No    Illness   Location:  Diffuse  Quality:  Anemia  Severity:  Severe  Onset quality:  Gradual  Duration:  1 day  Timing:  Constant  Progression:  Unchanged  Chronicity:  Recurrent  Associated symptoms: no abdominal pain, no chest pain, no congestion, no cough, no ear pain, no fever, no headaches, no loss of consciousness, no myalgias, no nausea, no rhinorrhea, no shortness of breath and no vomiting        Review of Systems   Unable to perform ROS: Dementia   Constitutional: Negative for fever.   HENT: Negative for congestion, ear pain and rhinorrhea.    Respiratory: Negative for cough and shortness of breath.    Cardiovascular: Negative for chest pain.   Gastrointestinal: Negative for abdominal pain, nausea and vomiting.   Musculoskeletal: Negative for myalgias.   Neurological: Negative for loss of consciousness and headaches.   All other systems reviewed and are negative.      Past Medical History:   Diagnosis Date   • Abnormal SPEP    • Adult failure to thrive    • Arthritis    • Chronic constipation    • Chronic kidney disease (CKD), stage IV (severe)    • COPD (chronic obstructive pulmonary disease)    • CRD (chronic renal disease), stage III     stage III   • Dementia    • Diabetes mellitus    • Disease of thyroid gland     HYPOTHYROIDISM   • Hx of macrocytic anemia    • Hyperlipidemia    • Hypernatremia    • Hypertension    • Hypoglycemia    • Losing weight    • Macrocytic anemia    • Metabolic encephalopathy    • Nicotine abuse    • Vitamin D deficiency        No Known Allergies    Past Surgical History:   Procedure Laterality Date   • BRAIN SURGERY      Head injury as a child       Family History   Problem Relation Age of Onset   • No Known Problems Mother    • Diabetes Father        Social History     Social History    • Marital status: Single     Social History Main Topics   • Smoking status: Current Every Day Smoker     Packs/day: 1.00     Types: Cigarettes   • Smokeless tobacco: Never Used   • Alcohol use No   • Drug use: No   • Sexual activity: Defer     Other Topics Concern   • Not on file       Prior to Admission medications    Medication Sig Start Date End Date Taking? Authorizing Provider   cyancobalamin 100 MCG tablet Take 1 tablet by mouth Daily. 3/21/18  Yes Ren Bland MD   ferrous sulfate 325 (65 FE) MG tablet Take 1 tablet by mouth 3 (Three) Times a Day With Meals. 3/6/18  Yes EDWIN Anderson   folic acid (FOLVITE) 1 MG tablet Take 1 tablet by mouth Daily. 3/21/18  Yes Ren Bland MD   levothyroxine (SYNTHROID, LEVOTHROID) 150 MCG tablet Take 1 tablet by mouth Daily. 3/20/18  Yes Ren Bland MD   lisinopril (PRINIVIL,ZESTRIL) 10 MG tablet Take 10 mg by mouth 2 (Two) Times a Day.   Yes Historical Provider, MD       Medications - No data to display    Vitals:    04/03/18 1647   BP: 170/42   Pulse: 58   Resp: 17   Temp:    SpO2: 98%         Objective   Physical Exam   Constitutional: She appears well-developed and well-nourished.   HENT:   Head: Normocephalic and atraumatic.   Cardiovascular: Normal rate and regular rhythm.    Pulmonary/Chest: Effort normal and breath sounds normal.   Skin: Skin is warm and dry. Capillary refill takes less than 2 seconds.   Psychiatric: She has a normal mood and affect. Her behavior is normal.   Nursing note and vitals reviewed.      Procedures         Lab Results (last 24 hours)     Procedure Component Value Units Date/Time    Basic Metabolic Panel [[id number]]  (Abnormal) Collected:  04/03/18 0700    Specimen:  Blood Updated:  04/03/18 1017     Glucose 70 mg/dL      BUN 20 mg/dL      Creatinine 0.96 mg/dL      Sodium 144 mmol/L      Potassium 4.3 mmol/L      Chloride 107 mmol/L      CO2 35.0 (H) mmol/L      Calcium 7.8 (L) mg/dL      eGFR    Amer 67 mL/min/1.73      BUN/Creatinine Ratio 20.8     Anion Gap 2.0 (L) mmol/L     Narrative:       The MDRD GFR formula is only valid for adults with stable renal function between ages 18 and 70.    Lipid Panel [[id number]]  (Abnormal) Collected:  04/03/18 0700    Specimen:  Blood Updated:  04/03/18 1026     Total Cholesterol 104 (L) mg/dL      Triglycerides 83 mg/dL      HDL Cholesterol 29 (L) mg/dL      LDL Cholesterol  38 mg/dL      LDL/HDL Ratio 2.01    Hepatic Function Panel [[id number]]  (Abnormal) Collected:  04/03/18 0700    Specimen:  Blood Updated:  04/03/18 1015     Total Protein 5.9 (L) g/dL      Albumin 2.10 (L) g/dL      ALT (SGPT) 20 U/L      AST (SGOT) 12 U/L      Alkaline Phosphatase 64 U/L      Total Bilirubin 0.2 mg/dL      Bilirubin, Direct 0.0 mg/dL      Bilirubin, Indirect 0.1 mg/dL     CBC (No Diff) [[id number]]  (Abnormal) Collected:  04/03/18 0700    Specimen:  Blood Updated:  04/03/18 1014     WBC 4.64 (L) 10*3/mm3      RBC 2.02 (L) 10*6/mm3      Hemoglobin 6.8 (C) g/dL      Hematocrit 20.9 (C) %      .5 (H) fL      MCH 33.7 (H) pg      MCHC 32.5 (L) g/dL      RDW 14.2 %      RDW-SD 53.6 fl      MPV 11.0 fL      Platelets 117 (L) 10*3/mm3     Hemoglobin A1c [[id number]] Collected:  04/03/18 0700    Specimen:  Blood Updated:  04/03/18 1122     Hemoglobin A1C 6.1 %     Narrative:       Less than 6.0           Non-Diabetic Range  6.0-7.0                 ADA Therapeutic Target  Greater than 7.0        Action Suggested    T4, Free [[id number]]  (Normal) Collected:  04/03/18 0700    Specimen:  Blood Updated:  04/03/18 1055     Free T4 1.35 ng/dL     TSH [[id number]]  (Abnormal) Collected:  04/03/18 0700    Specimen:  Blood Updated:  04/03/18 1107     TSH 22.000 (H) mIU/mL     Vitamin B12 [[id number]]  (Abnormal) Collected:  04/03/18 0700    Specimen:  Blood Updated:  04/03/18 1126     Vitamin B-12 >1,000 (H) pg/mL     Vitamin D 25 Hydroxy [[id number]]  (Abnormal) Collected:  04/03/18  0700    Specimen:  Blood Updated:  04/03/18 1055     25 Hydroxy, Vitamin D <12.8 (L) ng/ml     CBC & Differential [[id number]] Collected:  04/03/18 1325    Specimen:  Blood Updated:  04/03/18 1342    Narrative:       The following orders were created for panel order CBC & Differential.  Procedure                               Abnormality         Status                     ---------                               -----------         ------                     CBC Auto Differential[[id number]]        Abnormal            Final result                 Please view results for these tests on the individual orders.    CBC Auto Differential [[id number]]  (Abnormal) Collected:  04/03/18 1325    Specimen:  Blood Updated:  04/03/18 1342     WBC 4.76 (L) 10*3/mm3      RBC 2.26 (L) 10*6/mm3      Hemoglobin 7.4 (L) g/dL      Hematocrit 23.8 (L) %      .3 (H) fL      MCH 32.7 (H) pg      MCHC 31.1 (L) g/dL      RDW 14.5 %      RDW-SD 55.1 (H) fl      MPV 10.9 fL      Platelets 150 10*3/mm3      Neutrophil % 60.1 %      Lymphocyte % 32.6 %      Monocyte % 6.7 %      Eosinophil % 0.2 %      Basophil % 0.0 %      Immature Grans % 0.4 %      Neutrophils, Absolute 2.86 10*3/mm3      Lymphocytes, Absolute 1.55 10*3/mm3      Monocytes, Absolute 0.32 10*3/mm3      Eosinophils, Absolute 0.01 10*3/mm3      Basophils, Absolute 0.00 10*3/mm3      Immature Grans, Absolute 0.02 10*3/mm3      nRBC 0.0 /100 WBC     Comprehensive Metabolic Panel [[id number]]  (Abnormal) Collected:  04/03/18 1406    Specimen:  Blood Updated:  04/03/18 1445     Glucose 77 mg/dL      BUN 20 mg/dL      Creatinine 0.95 mg/dL      Sodium 142 mmol/L      Potassium 4.3 mmol/L      Chloride 108 mmol/L      CO2 32.0 (H) mmol/L      Calcium 7.8 (L) mg/dL      Total Protein 6.0 (L) g/dL      Albumin 2.10 (L) g/dL      ALT (SGPT) 24 U/L      AST (SGOT) 13 U/L      Alkaline Phosphatase 63 U/L      Total Bilirubin 0.3 mg/dL      eGFR  African Amer 68 mL/min/1.73      Globulin  3.9 gm/dL      A/G Ratio 0.5 (L) g/dL      BUN/Creatinine Ratio 21.1     Anion Gap 2.0 (L) mmol/L     Narrative:       The MDRD GFR formula is only valid for adults with stable renal function between ages 18 and 70.          No orders to display       ED Course  ED Course   Comment By Time   Patient is stable we will transfuse a couple units.  She will be discharged in stable condition. Rio Gar Jr., MD 04/03 1388          MDM  Number of Diagnoses or Management Options  Anemia, unspecified type: established and worsening     Amount and/or Complexity of Data Reviewed  Clinical lab tests: ordered and reviewed    Risk of Complications, Morbidity, and/or Mortality  Presenting problems: moderate  Diagnostic procedures: moderate  Management options: moderate    Patient Progress  Patient progress: stable      Final diagnoses:   Anemia, unspecified type          Rio Gar Jr., MD  04/03/18 4897

## 2018-04-03 NOTE — OUTREACH NOTE
Skilled Nursing Facility Discharge Flowsheet:     Skilled Nursing Facility Discharge Assessment 4/3/2018   Acute Facility Discharged From [place]   Acute Discharge Date 3/20/2018   Name of the Skilled Nursing Facility? [facility]   Tier Level of the Skilled Nursing Facility 3   Purpose of SNF Admission PT;OT;SP   Estimated length of stay for the patient? Unclear at present   Who is the insurance provider or payor of patient stay? -   Progression of Patient? Recieving therapy

## 2018-04-05 LAB
ABO + RH BLD: NORMAL
ABO + RH BLD: NORMAL
BH BB BLOOD EXPIRATION DATE: NORMAL
BH BB BLOOD EXPIRATION DATE: NORMAL
BH BB BLOOD TYPE BARCODE: 6200
BH BB BLOOD TYPE BARCODE: 6200
BH BB DISPENSE STATUS: NORMAL
BH BB DISPENSE STATUS: NORMAL
BH BB PRODUCT CODE: NORMAL
BH BB PRODUCT CODE: NORMAL
BH BB UNIT NUMBER: NORMAL
BH BB UNIT NUMBER: NORMAL
CROSSMATCH INTERPRETATION: NORMAL
CROSSMATCH INTERPRETATION: NORMAL
UNIT  ABO: NORMAL
UNIT  ABO: NORMAL
UNIT  RH: NORMAL
UNIT  RH: NORMAL

## 2018-04-06 ENCOUNTER — LAB REQUISITION (OUTPATIENT)
Dept: LAB | Facility: HOSPITAL | Age: 83
End: 2018-04-06

## 2018-04-06 DIAGNOSIS — Z00.00 ENCOUNTER FOR GENERAL ADULT MEDICAL EXAMINATION WITHOUT ABNORMAL FINDINGS: ICD-10-CM

## 2018-04-06 LAB
DEPRECATED RDW RBC AUTO: 60.6 FL (ref 40–54)
ERYTHROCYTE [DISTWIDTH] IN BLOOD BY AUTOMATED COUNT: 17.4 % (ref 12–15)
GIANT PLATELETS: ABNORMAL
HCT VFR BLD AUTO: 29.9 % (ref 37–47)
HGB BLD-MCNC: 9.8 G/DL (ref 12–16)
LYMPHOCYTES # BLD MANUAL: 0.82 10*3/MM3 (ref 0.8–7)
LYMPHOCYTES NFR BLD MANUAL: 20.4 % (ref 24–44)
LYMPHOCYTES NFR BLD MANUAL: 9.2 % (ref 0–12)
MCH RBC QN AUTO: 31.5 PG (ref 28–32)
MCHC RBC AUTO-ENTMCNC: 32.8 G/DL (ref 33–36)
MCV RBC AUTO: 96.1 FL (ref 82–98)
MONOCYTES # BLD AUTO: 0.37 10*3/MM3 (ref 0–1)
NEUTROPHILS # BLD AUTO: 2.47 10*3/MM3 (ref 1–11)
NEUTROPHILS NFR BLD MANUAL: 57.1 % (ref 37–80)
NEUTS BAND NFR BLD MANUAL: 4.1 % (ref 0–5)
PLATELET # BLD AUTO: 137 10*3/MM3 (ref 130–400)
PMV BLD AUTO: 10.3 FL (ref 6–12)
POIKILOCYTOSIS BLD QL SMEAR: ABNORMAL
RBC # BLD AUTO: 3.11 10*6/MM3 (ref 4.2–5.4)
SMUDGE CELLS BLD QL SMEAR: ABNORMAL
VARIANT LYMPHS NFR BLD MANUAL: 9.2 % (ref 0–5)
WBC NRBC COR # BLD: 4.04 10*3/MM3 (ref 4.8–10.8)

## 2018-04-06 PROCEDURE — 85025 COMPLETE CBC W/AUTO DIFF WBC: CPT | Performed by: NURSE PRACTITIONER

## 2018-04-06 PROCEDURE — 85007 BL SMEAR W/DIFF WBC COUNT: CPT | Performed by: NURSE PRACTITIONER

## 2018-04-06 PROCEDURE — 36415 COLL VENOUS BLD VENIPUNCTURE: CPT | Performed by: NURSE PRACTITIONER

## 2018-04-08 ENCOUNTER — LAB REQUISITION (OUTPATIENT)
Dept: LAB | Facility: HOSPITAL | Age: 83
End: 2018-04-08

## 2018-04-08 DIAGNOSIS — Z00.00 ENCOUNTER FOR GENERAL ADULT MEDICAL EXAMINATION WITHOUT ABNORMAL FINDINGS: ICD-10-CM

## 2018-04-08 LAB
ANION GAP SERPL CALCULATED.3IONS-SCNC: 5 MMOL/L (ref 4–13)
BUN BLD-MCNC: 21 MG/DL (ref 5–21)
BUN/CREAT SERPL: 17.9 (ref 7–25)
CALCIUM SPEC-SCNC: 8.3 MG/DL (ref 8.4–10.4)
CHLORIDE SERPL-SCNC: 105 MMOL/L (ref 98–110)
CO2 SERPL-SCNC: 34 MMOL/L (ref 24–31)
CREAT BLD-MCNC: 1.17 MG/DL (ref 0.5–1.4)
GFR SERPL CREATININE-BSD FRML MDRD: 53 ML/MIN/1.73
GLUCOSE BLD-MCNC: 99 MG/DL (ref 70–100)
POTASSIUM BLD-SCNC: 4 MMOL/L (ref 3.5–5.3)
SODIUM BLD-SCNC: 144 MMOL/L (ref 135–145)

## 2018-04-08 PROCEDURE — 80048 BASIC METABOLIC PNL TOTAL CA: CPT | Performed by: NURSE PRACTITIONER

## 2018-04-09 ENCOUNTER — LAB REQUISITION (OUTPATIENT)
Dept: LAB | Facility: HOSPITAL | Age: 83
End: 2018-04-09

## 2018-04-09 DIAGNOSIS — Z00.00 ENCOUNTER FOR GENERAL ADULT MEDICAL EXAMINATION WITHOUT ABNORMAL FINDINGS: ICD-10-CM

## 2018-04-09 LAB
BACTERIA UR QL AUTO: ABNORMAL /HPF
BASOPHILS # BLD AUTO: 0.02 10*3/MM3 (ref 0–0.2)
BASOPHILS NFR BLD AUTO: 0.4 % (ref 0–2)
BILIRUB UR QL STRIP: NEGATIVE
CLARITY UR: ABNORMAL
COLOR UR: YELLOW
DEPRECATED RDW RBC AUTO: 59.1 FL (ref 40–54)
EOSINOPHIL # BLD AUTO: 0.01 10*3/MM3 (ref 0–0.7)
EOSINOPHIL NFR BLD AUTO: 0.2 % (ref 0–4)
ERYTHROCYTE [DISTWIDTH] IN BLOOD BY AUTOMATED COUNT: 16.4 % (ref 12–15)
GLUCOSE UR STRIP-MCNC: NEGATIVE MG/DL
HCT VFR BLD AUTO: 39.3 % (ref 37–47)
HGB BLD-MCNC: 12.5 G/DL (ref 12–16)
HGB UR QL STRIP.AUTO: NEGATIVE
HYALINE CASTS UR QL AUTO: ABNORMAL /LPF
IMM GRANULOCYTES # BLD: 0.05 10*3/MM3 (ref 0–0.03)
IMM GRANULOCYTES NFR BLD: 0.9 % (ref 0–5)
KETONES UR QL STRIP: NEGATIVE
LEUKOCYTE ESTERASE UR QL STRIP.AUTO: NEGATIVE
LYMPHOCYTES # BLD AUTO: 1.31 10*3/MM3 (ref 0.72–4.86)
LYMPHOCYTES NFR BLD AUTO: 24.2 % (ref 15–45)
MCH RBC QN AUTO: 31.2 PG (ref 28–32)
MCHC RBC AUTO-ENTMCNC: 31.8 G/DL (ref 33–36)
MCV RBC AUTO: 98 FL (ref 82–98)
MONOCYTES # BLD AUTO: 0.64 10*3/MM3 (ref 0.19–1.3)
MONOCYTES NFR BLD AUTO: 11.8 % (ref 4–12)
NEUTROPHILS # BLD AUTO: 3.39 10*3/MM3 (ref 1.87–8.4)
NEUTROPHILS NFR BLD AUTO: 62.5 % (ref 39–78)
NITRITE UR QL STRIP: NEGATIVE
NRBC BLD MANUAL-RTO: 0 /100 WBC (ref 0–0)
PH UR STRIP.AUTO: 8.5 [PH] (ref 5–8)
PLAT MORPH BLD: NORMAL
PLATELET # BLD AUTO: 257 10*3/MM3 (ref 130–400)
PMV BLD AUTO: 10.3 FL (ref 6–12)
PROT UR QL STRIP: ABNORMAL
RBC # BLD AUTO: 4.01 10*6/MM3 (ref 4.2–5.4)
RBC # UR: ABNORMAL /HPF
REF LAB TEST METHOD: ABNORMAL
ROULEAUX BLD QL SMEAR: NORMAL
SP GR UR STRIP: 1.02 (ref 1–1.03)
SQUAMOUS #/AREA URNS HPF: ABNORMAL /HPF
UROBILINOGEN UR QL STRIP: ABNORMAL
WBC MORPH BLD: NORMAL
WBC NRBC COR # BLD: 5.42 10*3/MM3 (ref 4.8–10.8)
WBC UR QL AUTO: ABNORMAL /HPF

## 2018-04-09 PROCEDURE — 81001 URINALYSIS AUTO W/SCOPE: CPT | Performed by: NURSE PRACTITIONER

## 2018-04-09 PROCEDURE — 36415 COLL VENOUS BLD VENIPUNCTURE: CPT | Performed by: INTERNAL MEDICINE

## 2018-04-09 PROCEDURE — 85007 BL SMEAR W/DIFF WBC COUNT: CPT | Performed by: INTERNAL MEDICINE

## 2018-04-09 PROCEDURE — 87086 URINE CULTURE/COLONY COUNT: CPT | Performed by: NURSE PRACTITIONER

## 2018-04-09 PROCEDURE — 85025 COMPLETE CBC W/AUTO DIFF WBC: CPT | Performed by: INTERNAL MEDICINE

## 2018-04-11 ENCOUNTER — PATIENT OUTREACH (OUTPATIENT)
Dept: CASE MANAGEMENT | Facility: OTHER | Age: 83
End: 2018-04-11

## 2018-04-11 LAB — BACTERIA SPEC AEROBE CULT: NORMAL

## 2018-04-11 NOTE — OUTREACH NOTE
Skilled Nursing Facility Discharge Flowsheet:     Skilled Nursing Facility Discharge Assessment 4/11/2018   Acute Facility Discharged From [place]   Acute Discharge Date 3/20/2018   Name of the Skilled Nursing Facility? [facility]   Tier Level of the Skilled Nursing Facility 2   Purpose of SNF Admission PT;OT;SP   Estimated length of stay for the patient? 4/6   Who is the insurance provider or payor of patient stay? Medicare   Progression of Patient? Moved to comfort care   Skilled Nursing Discharge Date? 4/6/2018   Where was the patient discharged to? LTC

## 2018-04-13 ENCOUNTER — EPISODE CHANGES (OUTPATIENT)
Dept: CASE MANAGEMENT | Facility: OTHER | Age: 83
End: 2018-04-13

## 2018-04-13 ENCOUNTER — APPOINTMENT (OUTPATIENT)
Dept: GENERAL RADIOLOGY | Facility: HOSPITAL | Age: 83
End: 2018-04-13

## 2018-04-13 ENCOUNTER — APPOINTMENT (OUTPATIENT)
Dept: CT IMAGING | Facility: HOSPITAL | Age: 83
End: 2018-04-13

## 2018-04-13 ENCOUNTER — HOSPITAL ENCOUNTER (EMERGENCY)
Facility: HOSPITAL | Age: 83
Discharge: HOME | End: 2018-04-13
Attending: EMERGENCY MEDICINE | Admitting: EMERGENCY MEDICINE

## 2018-04-13 VITALS
TEMPERATURE: 98.2 F | DIASTOLIC BLOOD PRESSURE: 90 MMHG | BODY MASS INDEX: 15.72 KG/M2 | HEART RATE: 76 BPM | SYSTOLIC BLOOD PRESSURE: 161 MMHG | OXYGEN SATURATION: 96 % | WEIGHT: 80.5 LBS | RESPIRATION RATE: 14 BRPM

## 2018-04-13 DIAGNOSIS — S00.03XA CONTUSION OF SCALP, INITIAL ENCOUNTER: ICD-10-CM
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
## 2018-04-13 LAB
ALBUMIN SERPL-MCNC: 2.8 G/DL (ref 3.5–5)
ALBUMIN/GLOB SERPL: 0.6 G/DL (ref 1.1–2.5)
ALP SERPL-CCNC: 91 U/L (ref 24–120)
ALT SERPL W P-5'-P-CCNC: 17 U/L (ref 0–54)
ANION GAP SERPL CALCULATED.3IONS-SCNC: 4 MMOL/L (ref 4–13)
AST SERPL-CCNC: 21 U/L (ref 7–45)
BASOPHILS # BLD AUTO: 0.03 10*3/MM3 (ref 0–0.2)
BASOPHILS NFR BLD AUTO: 0.4 % (ref 0–2)
BILIRUB SERPL-MCNC: 0.4 MG/DL (ref 0.1–1)
BUN BLD-MCNC: 17 MG/DL (ref 5–21)
BUN/CREAT SERPL: 13.6 (ref 7–25)
CALCIUM SPEC-SCNC: 8.4 MG/DL (ref 8.4–10.4)
CHLORIDE SERPL-SCNC: 107 MMOL/L (ref 98–110)
CO2 SERPL-SCNC: 36 MMOL/L (ref 24–31)
CREAT BLD-MCNC: 1.25 MG/DL (ref 0.5–1.4)
DEPRECATED RDW RBC AUTO: 59.2 FL (ref 40–54)
EOSINOPHIL # BLD AUTO: 0 10*3/MM3 (ref 0–0.7)
EOSINOPHIL NFR BLD AUTO: 0 % (ref 0–4)
ERYTHROCYTE [DISTWIDTH] IN BLOOD BY AUTOMATED COUNT: 16.4 % (ref 12–15)
GFR SERPL CREATININE-BSD FRML MDRD: 49 ML/MIN/1.73
GLOBULIN UR ELPH-MCNC: 4.6 GM/DL
GLUCOSE BLD-MCNC: 132 MG/DL (ref 70–100)
HCT VFR BLD AUTO: 39.4 % (ref 37–47)
HGB BLD-MCNC: 12.6 G/DL (ref 12–16)
HOLD SPECIMEN: NORMAL
IMM GRANULOCYTES # BLD: 0.11 10*3/MM3 (ref 0–0.03)
IMM GRANULOCYTES NFR BLD: 1.5 % (ref 0–5)
LYMPHOCYTES # BLD AUTO: 1.12 10*3/MM3 (ref 0.72–4.86)
LYMPHOCYTES NFR BLD AUTO: 15 % (ref 15–45)
MCH RBC QN AUTO: 31.1 PG (ref 28–32)
MCHC RBC AUTO-ENTMCNC: 32 G/DL (ref 33–36)
MCV RBC AUTO: 97.3 FL (ref 82–98)
MONOCYTES # BLD AUTO: 0.57 10*3/MM3 (ref 0.19–1.3)
MONOCYTES NFR BLD AUTO: 7.6 % (ref 4–12)
NEUTROPHILS # BLD AUTO: 5.65 10*3/MM3 (ref 1.87–8.4)
NEUTROPHILS NFR BLD AUTO: 75.5 % (ref 39–78)
NRBC BLD MANUAL-RTO: 0.3 /100 WBC (ref 0–0)
PLATELET # BLD AUTO: 393 10*3/MM3 (ref 130–400)
PMV BLD AUTO: 10.7 FL (ref 6–12)
POTASSIUM BLD-SCNC: 4.3 MMOL/L (ref 3.5–5.3)
PROT SERPL-MCNC: 7.4 G/DL (ref 6.3–8.7)
RBC # BLD AUTO: 4.05 10*6/MM3 (ref 4.2–5.4)
SODIUM BLD-SCNC: 147 MMOL/L (ref 135–145)
WBC NRBC COR # BLD: 7.48 10*3/MM3 (ref 4.8–10.8)
WHOLE BLOOD HOLD SPECIMEN: NORMAL

## 2018-04-13 PROCEDURE — 70450 CT HEAD/BRAIN W/O DYE: CPT

## 2018-04-13 PROCEDURE — 85025 COMPLETE CBC W/AUTO DIFF WBC: CPT | Performed by: EMERGENCY MEDICINE

## 2018-04-13 PROCEDURE — 73523 X-RAY EXAM HIPS BI 5/> VIEWS: CPT

## 2018-04-13 PROCEDURE — 99284 EMERGENCY DEPT VISIT MOD MDM: CPT

## 2018-04-13 PROCEDURE — 80053 COMPREHEN METABOLIC PANEL: CPT | Performed by: EMERGENCY MEDICINE

## 2018-04-13 NOTE — DISCHARGE INSTRUCTIONS
Contusion  A contusion is a deep bruise. Contusions happen when an injury causes bleeding under the skin. Symptoms of bruising include pain, swelling, and discolored skin. The skin may turn blue, purple, or yellow.  Follow these instructions at home:  · Rest the injured area.  · If told, put ice on the injured area.  ¨ Put ice in a plastic bag.  ¨ Place a towel between your skin and the bag.  ¨ Leave the ice on for 20 minutes, 2-3 times per day.  · If told, put light pressure (compression) on the injured area using an elastic bandage. Make sure the bandage is not too tight. Remove it and put it back on as told by your doctor.  · If possible, raise (elevate) the injured area above the level of your heart while you are sitting or lying down.  · Take over-the-counter and prescription medicines only as told by your doctor.  Contact a doctor if:  · Your symptoms do not get better after several days of treatment.  · Your symptoms get worse.  · You have trouble moving the injured area.  Get help right away if:  · You have very bad pain.  · You have a loss of feeling (numbness) in a hand or foot.  · Your hand or foot turns pale or cold.  This information is not intended to replace advice given to you by your health care provider. Make sure you discuss any questions you have with your health care provider.  Document Released: 06/05/2009 Document Revised: 05/25/2017 Document Reviewed: 05/04/2016  [employer] Patient Education © 2017 [employer]      Facial or Scalp Contusion  A facial or scalp contusion is a deep bruise (contusion) on the face or head. Bruises happen when an injury causes bleeding under the skin. The bruise may turn blue, purple, or yellow. Minor injuries will cause a bruise that is not painful, but worse bruises can stay painful and swollen for a few weeks.  Follow these instructions at home:  · Take over-the-counter and prescription medicines only as told by your doctor.  · If directed, apply ice  to the injured area.  ¨ Put ice in a plastic bag.  ¨ Place a towel between your skin and the bag.  ¨ Leave the ice on for 20 minutes, 2-3 times a day.  · Keep all follow-up visits as told by your doctor. This is important.  Contact a doctor if:  · You have trouble biting or chewing.  · Your pain or swelling gets worse.  · You have pain when you move your eyes.  Get help right away if:  · You have very bad pain or a headache, and medicine does not help.  · You are very tired or confused, or your personality changes.  · You throw up (vomit).  · You have a nosebleed that does not stop.  · You see two of everything (double vision) or have blurry vision.  · You have clear fluid coming from your nose or ear, and it does not go away.  · You have problems walking or using your arms or legs.  · You are very dizzy.  Summary  · A facial or scalp contusion is a deep bruise (contusion) on the face or head.  · Bruises happen when an injury causes bleeding under the skin.  · Minor injuries will cause a bruise that is not painful, but worse bruises can stay painful and swollen for a few weeks.  This information is not intended to replace advice given to you by your health care provider. Make sure you discuss any questions you have with your health care provider.  Document Released: 12/06/2012 Document Revised: 11/07/2017 Document Reviewed: 11/07/2017  [employer] Interactive Patient Education © 2017 [employer]

## 2018-04-13 NOTE — ED PROVIDER NOTES
Subjective     Fall   Mechanism of injury: fall    Injury location:  Head/neck  Head/neck injury location:  Head  Incident location:  Nursing home  Arrived directly from scene: yes    Fall:     Fall occurred:  From a bed    Impact surface:  Hard floor    Point of impact:  Head    Entrapped after fall: no    Tetanus status:  Out of date  Prior to arrival data:     Airway condition since incident:  Stable    Breathing condition since incident:  Stable    Circulation condition since incident:  Stable    Mental status condition since incident:  Stable    Disability condition since incident:  Stable  Associated symptoms: no abdominal pain, no back pain, no blindness, no chest pain, no difficulty breathing, no hearing loss, no loss of consciousness, no nausea, no neck pain, no seizures and no vomiting    Risk factors: no anticoagulation therapy, no diabetes and no steroid use        Review of Systems   Constitutional: Negative.    HENT: Negative.  Negative for hearing loss.    Eyes: Negative.  Negative for blindness.   Respiratory: Negative.    Cardiovascular: Negative.  Negative for chest pain.   Gastrointestinal: Negative.  Negative for abdominal pain, nausea and vomiting.   Musculoskeletal: Negative.  Negative for back pain and neck pain.   Skin: Negative.    Neurological: Negative.  Negative for seizures and loss of consciousness.   All other systems reviewed and are negative.      Past Medical History:   Diagnosis Date   • Abnormal SPEP    • Adult failure to thrive    • Arthritis    • Chronic constipation    • Chronic kidney disease (CKD), stage IV (severe)    • COPD (chronic obstructive pulmonary disease)    • CRD (chronic renal disease), stage III     stage III   • Dementia    • Diabetes mellitus    • Disease of thyroid gland     HYPOTHYROIDISM   • Hx of macrocytic anemia    • Hyperlipidemia    • Hypernatremia    • Hypertension    • Hypoglycemia    • Losing weight    • Macrocytic anemia    • Metabolic  encephalopathy    • Nicotine abuse    • Vitamin D deficiency        No Known Allergies    Past Surgical History:   Procedure Laterality Date   • BRAIN SURGERY      Head injury as a child       Family History   Problem Relation Age of Onset   • No Known Problems Mother    • Diabetes Father        Social History     Social History   • Marital status: Single     Social History Main Topics   • Smoking status: Current Every Day Smoker     Packs/day: 1.00     Types: Cigarettes   • Smokeless tobacco: Never Used   • Alcohol use No   • Drug use: No   • Sexual activity: Defer     Other Topics Concern   • Not on file           Objective   Physical Exam   Constitutional: She is oriented to person, place, and time. Vital signs are normal. She appears well-developed and well-nourished.  Non-toxic appearance. No distress.   HENT:   Head: Normocephalic. Head is without raccoon's eyes, without Hutchins's sign, without abrasion, without contusion and without laceration.   Right Ear: Tympanic membrane and external ear normal.   Left Ear: Tympanic membrane and external ear normal.   Nose: Nose normal.   Mouth/Throat: Oropharynx is clear and moist.   Scalp hematoma   Eyes: Conjunctivae, EOM and lids are normal. Pupils are equal, round, and reactive to light.   Neck: Trachea normal, normal range of motion and full passive range of motion without pain. Neck supple. No JVD present. No spinous process tenderness and no muscular tenderness present. Carotid bruit is not present. No tracheal deviation and normal range of motion present.   Cardiovascular: Normal rate, regular rhythm, normal heart sounds, intact distal pulses and normal pulses.  PMI is not displaced.    Pulmonary/Chest: Effort normal and breath sounds normal. No accessory muscle usage or stridor. No apnea and no tachypnea. No respiratory distress. Chest wall is not dull to percussion. She exhibits no mass, no tenderness, no bony tenderness, no laceration, no crepitus, no deformity  and no swelling.   Abdominal: Soft. Normal aorta and bowel sounds are normal. There is no hepatosplenomegaly. There is no tenderness. There is no CVA tenderness.   Musculoskeletal: Normal range of motion.        Right hip: Normal.        Left hip: Normal.        Cervical back: Normal. She exhibits normal range of motion, no tenderness, no bony tenderness, no spasm and normal pulse.        Thoracic back: Normal. She exhibits normal range of motion, no tenderness, no bony tenderness, no spasm and normal pulse.        Lumbar back: She exhibits normal range of motion, no tenderness, no bony tenderness, no deformity, no laceration, no pain, no spasm and normal pulse.   Neurological: She is alert and oriented to person, place, and time. She has normal strength and normal reflexes. No cranial nerve deficit or sensory deficit. GCS eye subscore is 4. GCS verbal subscore is 5. GCS motor subscore is 6.   Reflex Scores:       Tricep reflexes are 2+ on the right side and 2+ on the left side.       Bicep reflexes are 2+ on the right side and 2+ on the left side.       Patellar reflexes are 2+ on the right side and 2+ on the left side.       Achilles reflexes are 2+ on the right side and 2+ on the left side.  Skin: Skin is warm, dry and intact. No abrasion, no ecchymosis and no laceration noted.   Psychiatric: She has a normal mood and affect. Her speech is normal and behavior is normal.   Nursing note and vitals reviewed.      Procedures         ED Course  ED Course   Comment By Time   CT is negative except for this CSF extra-axial density collection of fluid there is no lateralizing symptoms patient is going to be discharged back to nursing home Titi Chan MD 04/13 0227                  [facility]    Final diagnoses:   Fall, initial encounter   Contusion of scalp, initial encounter            Titi Chan MD  04/13/18 9648

## 2018-04-16 ENCOUNTER — OFFICE VISIT (OUTPATIENT)
Dept: NEUROSURGERY | Facility: CLINIC | Age: 83
End: 2018-04-16

## 2018-04-16 VITALS
DIASTOLIC BLOOD PRESSURE: 90 MMHG | HEIGHT: 60 IN | WEIGHT: 80 LBS | BODY MASS INDEX: 15.71 KG/M2 | SYSTOLIC BLOOD PRESSURE: 161 MMHG

## 2018-04-16 DIAGNOSIS — S06.5XAA SUBDURAL HEMATOMA (HCC): Primary | ICD-10-CM

## 2018-04-16 DIAGNOSIS — R63.6 UNDERWEIGHT: ICD-10-CM

## 2018-04-16 DIAGNOSIS — Z72.0 TOBACCO ABUSE: ICD-10-CM

## 2018-04-16 PROCEDURE — 99213 OFFICE O/P EST LOW 20 MIN: CPT | Performed by: NEUROLOGICAL SURGERY

## 2018-04-16 NOTE — PROGRESS NOTES
"    Chief complaint   Chief Complaint   Patient presents with   • subdural hematoma        Subjective     HPI:     This patient is an 84-year-old female found down and extremely cachectic.  She was brought to the hospital and eventually discharged back to nursing facility.  She was designated as DNR/DNI by son.  She recently had another fall in the nursing home and was brought to the emergency department, where she revealed an enlarging aside subdural hematoma.  Her son, power of , is present at bedside.    Review of Systems     Past Medical History:   Diagnosis Date   • Abnormal SPEP    • Adult failure to thrive    • Arthritis    • Chronic constipation    • Chronic kidney disease (CKD), stage IV (severe)    • COPD (chronic obstructive pulmonary disease)    • CRD (chronic renal disease), stage III     stage III   • Dementia    • Diabetes mellitus    • Disease of thyroid gland     HYPOTHYROIDISM   • Hx of macrocytic anemia    • Hyperlipidemia    • Hypernatremia    • Hypertension    • Hypoglycemia    • Losing weight    • Macrocytic anemia    • Metabolic encephalopathy    • Nicotine abuse    • Vitamin D deficiency      Past Surgical History:   Procedure Laterality Date   • BRAIN SURGERY      Head injury as a child     Family History   Problem Relation Age of Onset   • No Known Problems Mother    • Diabetes Father      Social History   Substance Use Topics   • Smoking status: Current Every Day Smoker     Packs/day: 1.00     Types: Cigarettes   • Smokeless tobacco: Never Used   • Alcohol use No       (Not in a hospital admission)  Allergies:  Review of patient's allergies indicates no known allergies.    Objective      Vital Signs  /90   Ht 152.4 cm (60\")   Wt 36.3 kg (80 lb)   BMI 15.62 kg/m²     Physical Exam    No acute distress  Awake, alert  Baseline dementia unchanged, not oriented to year at baseline  In stretcher, brought by EMS  Moves all extremities antigravity 5/5 except 4 out of 5 strength in " the left upper extremity  Sensation is intact to light touch in upper and lower extremities    Results Review:     Ct Head Without Contrast    Result Date: 4/13/2018  Narrative: EXAMINATION: CT head without contrast 4/13/2018  HISTORY: Head trauma. Headache.  DOSE: 704 mGycm. Automated exposure control was utilized to diminish patient radiation dose..  FINDINGS: Today's exam is compared to previous study of 3/15/2018. Multiple contiguous axial images are obtained from the skull base to the vertex per protocol without intravenous contrast-enhancement with reformatted images obtained in the sagittal and coronal projections from the original data set.  There is atrophy of the brain with prominence of the subarachnoid spaces and ventricular enlargement. Small vessel ischemic changes are noted involving the periventricular and higher white matter tracts. There are bilateral extra-axial subdural collections. These are slightly hyperdense in comparison with CSF and have shown increase in size from the previous study suggesting they represent chronic subdural hematomas. The right frontal subdural collection previously measured approximately 5 mm in maximum thickness now measures 9 mm. A tiny subdural collection within the left frontal subdural space previously measured at 4 mm now measures 1.2 cm in thickness. There is mild shift to the midline from left-to-right of 3 mm. There is mild mass effect on the frontal lobes bilaterally. There is no sulcal effacement. The basilar cisterns remain patent. There is evidence of a remote right frontal lobe subcortical infarct.  Visualized paranasal sinuses and mastoid air cells are clear.      Impression: 1.. Bilateral hypodense subdural fluid collections showing progression from the previous study. These are slightly hyperdense in comparison with CSF and I feel represent chronic subdural hematomas. The largest is on the left measuring 1.2 cm in thickness. There is some slight shift of  the midline from left to right with some mass effect noted on the frontal lobes seen best on the coronal reconstructions. I spoke with Dr. Gar in the emergency room at 7:25 AM. I feel neurosurgical consultation will be warranted given the change in the appearance of the subdural collections from the previous study of only approximately one month earlier. 2. Atrophy with small vessel disease. Remote right frontal subcortical infarct. This report was finalized on 04/13/2018 07:27 by Dr. Phillip Parrish MD.    Xr Hips Bilateral With Or Without Pelvis 5 View    Result Date: 4/13/2018  Narrative: EXAMINATION:   XR HIPS BILATERAL W OR WO PELVIS 5 VIEW-  4/13/2018 7:14 AM CDT  HISTORY: Trauma  AP view the pelvis AP and lateral views of the right and left hip are obtained. SI joints are normal. The symphysis appears intact.  The right and left hip appear intact as visualized. An obvious fracture is not identified.  Phleboliths are noted in the pelvis.  Pression: Negative AP pelvis AP and lateral right and left hip This report was finalized on 04/13/2018 07:15 by Dr. Westley Olivo MD.              Assessment/Plan:     84-year-old female with enlarging subdural hematomas.  Patient currently is asymptomatic from the larger left sided subdural hematoma and has no obvious right-sided weakness.  I directly reviewed imaging findings with the patient's son and I have answered all questions.  He voices understanding that if the subdural hematoma continues to enlarge, it may become life threatening and cause neurologic deficit.  However given her baseline dementia and cachexia, he does not wish to proceed with surgical intervention.  As he has designated her as DO NOT RESUSCITATE, DO NOT INTUBATE, no surgery, we will not follow this enlarging subdural hematoma with serial imaging.      I discussed the patients findings and my recommendations with patient    Elroy Stephens MD  04/16/18  9:57 AM

## 2018-05-23 ENCOUNTER — HOSPITAL ENCOUNTER (EMERGENCY)
Facility: HOSPITAL | Age: 83
Discharge: HOME | End: 2018-05-23
Attending: EMERGENCY MEDICINE | Admitting: EMERGENCY MEDICINE

## 2018-05-23 ENCOUNTER — APPOINTMENT (OUTPATIENT)
Dept: CT IMAGING | Facility: HOSPITAL | Age: 83
End: 2018-05-23

## 2018-05-23 VITALS
WEIGHT: 62 LBS | BODY MASS INDEX: 10.58 KG/M2 | HEART RATE: 66 BPM | SYSTOLIC BLOOD PRESSURE: 116 MMHG | HEIGHT: 64 IN | RESPIRATION RATE: 20 BRPM | DIASTOLIC BLOOD PRESSURE: 39 MMHG | TEMPERATURE: 97.5 F | OXYGEN SATURATION: 99 %

## 2018-05-23 DIAGNOSIS — S06.5XAA SUBDURAL HEMATOMA (HCC): Primary | ICD-10-CM

## 2018-05-23 PROCEDURE — 70450 CT HEAD/BRAIN W/O DYE: CPT

## 2018-05-23 PROCEDURE — 99284 EMERGENCY DEPT VISIT MOD MDM: CPT

## 2018-05-23 NOTE — ED PROVIDER NOTES
"Subjective   Patient found by NH staff in floor but no one saw her fall or know how she fell.  They put her back in bed.  Noted hematoma and superficial lac on forehead.  Called family and they said send her here.  Patient is at baseline with mental status from her dementia.  Patient denies pain except for \"little sore where I fell\".        History provided by:  Patient   used: No    Fall   Mechanism of injury: fall    Injury location:  Head/neck  Head/neck injury location:  Head  Incident location:  alf  Time since incident:  2 hours  Arrived directly from scene: no    Fall:     Fall occurred:  From a bed    Impact surface:  Hard floor    Point of impact:  Unable to specify    Entrapped after fall: no    Protective equipment: none    Suspicion of alcohol use: no    Suspicion of drug use: no    Tetanus status:  Unknown  Prior to arrival data:     Bystander interventions:  None    Patient ambulatory at scene: no      Blood loss:  Minimal    Responsiveness at scene:  Alert    Orientation at scene:  Person    Loss of consciousness: no      Amnesic to event: no      Airway interventions:  None    Breathing interventions:  None    IV access status:  None    IO access:  None    Fluids administered:  None    Cardiac interventions:  None    Medications administered:  None    Immobilization:  None  Associated symptoms: no abdominal pain, no back pain, no blindness, no chest pain, no difficulty breathing, no headaches, no hearing loss, no loss of consciousness, no nausea, no neck pain, no seizures and no vomiting    Risk factors: no AICD, no asthma, no beta blocker therapy, no CABG, no COPD, no diabetes, no kidney disease, no pacemaker and not pregnant        Review of Systems   Constitutional: Negative.    HENT: Negative.  Negative for hearing loss.    Eyes: Negative for blindness.   Respiratory: Negative.    Cardiovascular: Negative.  Negative for chest pain.   Gastrointestinal: Negative.  " Negative for abdominal pain, nausea and vomiting.   Genitourinary: Negative.    Musculoskeletal: Negative.  Negative for back pain and neck pain.   Skin: Negative.    Neurological: Negative.  Negative for seizures, loss of consciousness and headaches.   Hematological: Negative.    Psychiatric/Behavioral: Negative.    All other systems reviewed and are negative.      Past Medical History:   Diagnosis Date   • Abnormal SPEP    • Adult failure to thrive    • Arthritis    • Chronic constipation    • Chronic kidney disease (CKD), stage IV (severe)    • COPD (chronic obstructive pulmonary disease)    • CRD (chronic renal disease), stage III     stage III   • Dementia    • Diabetes mellitus    • Disease of thyroid gland     HYPOTHYROIDISM   • Hx of macrocytic anemia    • Hyperlipidemia    • Hypernatremia    • Hypertension    • Hypoglycemia    • Losing weight    • Macrocytic anemia    • Metabolic encephalopathy    • Nicotine abuse    • Vitamin D deficiency        No Known Allergies    Past Surgical History:   Procedure Laterality Date   • BRAIN SURGERY      Head injury as a child       Family History   Problem Relation Age of Onset   • No Known Problems Mother    • Diabetes Father        Social History     Social History   • Marital status: Single     Social History Main Topics   • Smoking status: Current Every Day Smoker     Packs/day: 1.00     Types: Cigarettes   • Smokeless tobacco: Never Used   • Alcohol use No   • Drug use: No   • Sexual activity: Defer     Other Topics Concern   • Not on file       Prior to Admission medications    Medication Sig Start Date End Date Taking? Authorizing Provider   cyancobalamin 100 MCG tablet Take 1 tablet by mouth Daily. 3/21/18   Ren Bland MD   ferrous sulfate 325 (65 FE) MG tablet Take 1 tablet by mouth 3 (Three) Times a Day With Meals. 3/6/18   EDWIN Anderson   folic acid (FOLVITE) 1 MG tablet Take 1 tablet by mouth Daily. 3/21/18   Ren Bland MD    levothyroxine (SYNTHROID, LEVOTHROID) 150 MCG tablet Take 1 tablet by mouth Daily. 3/20/18   Ren Bland MD   lisinopril (PRINIVIL,ZESTRIL) 10 MG tablet Take 10 mg by mouth 2 (Two) Times a Day.    Historical Provider, MD       Medications - No data to display    Vitals:    05/23/18 1732   BP:    Pulse:    Resp:    Temp: 97.4 °F (36.3 °C)   SpO2:          Objective   Physical Exam   Constitutional: She appears well-developed and well-nourished.   HENT:   Head: Normocephalic.   2 cm oval area of swelling left side of forehead with superficial cuts noted.   Eyes: EOM are normal. Pupils are equal, round, and reactive to light.   Neck: Normal range of motion. Neck supple.   Cardiovascular: Normal rate and regular rhythm.    Pulmonary/Chest: Effort normal and breath sounds normal.   Abdominal: Soft. Bowel sounds are normal.   Musculoskeletal: Normal range of motion.   Neurological: She is alert.   Skin: Skin is warm and dry.   Psychiatric: She has a normal mood and affect. Her behavior is normal.   Nursing note and vitals reviewed.      Procedures         Lab Results (last 24 hours)     ** No results found for the last 24 hours. **          CT Head Without Contrast   Final Result   1. Small acute hemorrhagic component to the chronic left subdural   collection as described above. No midline shifting. This finding   discussed with Dr. Gar in the ER at 6:09 PM on 5/23/2018.   2. Chronic right subdural hematoma, unchanged from 4/13/2018.   3. No intraparenchymal hematoma. Chronic small vessel ischemic changes.    4. Soft tissue injury of the left forehead. No depressed skull fracture.   This report was finalized on 05/23/2018 18:08 by Dr. Eileen Dumont MD.          ED Course  ED Course as of May 23 1831   Wed May 23, 2018   1828 Talked with Dr. Stephens and was originally going to admit but he reviewed his notes and said family had requested no intervention so admission not needed and will DC to NH.  [TR]       ED Course User Index  [TR] Rio Gar Jr., MD          MDM  Number of Diagnoses or Management Options  Subdural hematoma: new and requires workup     Amount and/or Complexity of Data Reviewed  Tests in the radiology section of CPT®: ordered and reviewed    Risk of Complications, Morbidity, and/or Mortality  Presenting problems: moderate  Diagnostic procedures: moderate  Management options: moderate    Patient Progress  Patient progress: stable      Final diagnoses:   Subdural hematoma          Rio Gar Jr., MD  05/23/18 5228

## 2018-05-24 NOTE — ED NOTES
Attempted to call report to [facility] but no answer     Jeremías Varela, NARGIS  05/23/18 2016

## 2018-05-24 NOTE — ED NOTES
Attempted to call report back to [facility] was on hold with no anwer.     Mary Limon RN  05/23/18 1933

## 2018-06-19 ENCOUNTER — HOSPITAL ENCOUNTER (EMERGENCY)
Facility: HOSPITAL | Age: 83
End: 2018-06-19

## 2018-10-10 ENCOUNTER — EPISODE CHANGES (OUTPATIENT)
Dept: CASE MANAGEMENT | Facility: OTHER | Age: 83
End: 2018-10-10

## 2022-04-04 NOTE — PROGRESS NOTES
[facility]  INPATIENT PROGRESS NOTE    Length of Stay: 3  Date of Admission: 3/15/2018  Primary Care Physician: Jorgito Muñoz DO    Subjective   Chief Complaint: Patient arouses to verbal stimulation.  Says no to all questions.    HPI   Unable to obtain due to patients mental status.  Significant dementia.      Review of Systems   Unable due to dementia.    Patient was transferred to comfort measures during night.      Objective    Temp:  [96.6 °F (35.9 °C)-97.2 °F (36.2 °C)] 97.2 °F (36.2 °C)  Heart Rate:  [43-93] 93  Resp:  [9-18] 16  BP: ()/(35-85) 89/55  Physical Exam   Constitutional: She appears well-developed.   HENT:   Head: Normocephalic and atraumatic.   Eyes: Conjunctivae and EOM are normal. Pupils are equal, round, and reactive to light. No scleral icterus.   Neck: Neck supple. No JVD present.   Cardiovascular: Normal rate and regular rhythm.  Exam reveals no gallop and no friction rub.    No murmur heard.  Pulmonary/Chest: Effort normal and breath sounds normal.   Abdominal: Soft. Bowel sounds are normal. There is no hepatosplenomegaly. There is no tenderness.   Musculoskeletal: Normal range of motion. She exhibits no edema.   Neurological: She is alert. No cranial nerve deficit.   Awakened to verbal stimuli, smiles and answers no to all questions.  Does not follow command.  No focal deficit.    Skin: Skin is warm and dry.   Psychiatric: She has a normal mood and affect.   Nursing note and vitals reviewed.          Results Review:  I have reviewed the labs, radiology results, and diagnostic studies.    Laboratory Data:     Results from last 7 days  Lab Units 03/17/18  0633 03/16/18  0443 03/15/18  1202   WBC 10*3/mm3 10.51 7.04 7.46   HEMOGLOBIN g/dL 9.0* 11.0* 12.0   HEMATOCRIT % 27.0* 33.2* 36.8*   PLATELETS 10*3/mm3 203 245 261          Results from last 7 days  Lab Units 03/17/18  0633 03/16/18  0443 03/15/18  1836 03/15/18  1202  Pt notified on 04/04/2022 @ 8:38 about bone density test. She would like to restart the fosamax.     Thank you    Osman Trevizo   SODIUM mmol/L 145 150* 146* 145   POTASSIUM mmol/L 3.4* 4.1 4.4 5.6*   CHLORIDE mmol/L 113* 114* 112* 108   CO2 mmol/L 25.0 22.0* 25.0 19.0*   BUN mg/dL 27* 34* 36* 36*   CREATININE mg/dL 1.43* 1.82* 2.07* 1.93*   CALCIUM mg/dL 7.8* 8.5 8.7 9.7   BILIRUBIN mg/dL 0.3 0.5  --  1.0   ALK PHOS U/L 58 69  --  96   ALT (SGPT) U/L 28 31  --  34   AST (SGOT) U/L 45 57*  --  74*   GLUCOSE mg/dL 217* 219* 56* 109*       Culture Data:   Blood Culture   Date Value Ref Range Status   03/15/2018 No growth at 2 days  Preliminary   03/15/2018 Abnormal Stain (A)  Preliminary   03/15/2018 Gram Positive Cocci (A)  Preliminary       Radiology Data:   Imaging Results (last 24 hours)     ** No results found for the last 24 hours. **          I have reviewed the patient current medications.     Assessment/Plan     Hospital Problem List     Elevated TSH          Assessment    Metabolic encephalopathy   Markedly elevated TSH in a patient with known hypothyroidism. Does not take medication  Hypertension.   Abnormal CT head, bilateral subdural v hygromas  Fall at home, patient found in floor.    Abnormal chest xray   Hypothermia, resolved      Plan    Social service for placement  Continue comfort measures.        Discharge Planning: I expect the patient to be discharged to NH.    Cassie Matthews DO   03/18/18   11:09 AM